# Patient Record
Sex: MALE | Race: WHITE | NOT HISPANIC OR LATINO | Employment: OTHER | URBAN - METROPOLITAN AREA
[De-identification: names, ages, dates, MRNs, and addresses within clinical notes are randomized per-mention and may not be internally consistent; named-entity substitution may affect disease eponyms.]

---

## 2018-11-15 ENCOUNTER — OFFICE VISIT (OUTPATIENT)
Dept: MEDICAL GROUP | Facility: MEDICAL CENTER | Age: 73
End: 2018-11-15
Payer: MEDICARE

## 2018-11-15 VITALS
WEIGHT: 223.33 LBS | OXYGEN SATURATION: 93 % | BODY MASS INDEX: 33.08 KG/M2 | TEMPERATURE: 99.1 F | SYSTOLIC BLOOD PRESSURE: 142 MMHG | DIASTOLIC BLOOD PRESSURE: 80 MMHG | HEART RATE: 92 BPM | HEIGHT: 69 IN | RESPIRATION RATE: 20 BRPM

## 2018-11-15 DIAGNOSIS — Z72.89 OTHER PROBLEMS RELATED TO LIFESTYLE: ICD-10-CM

## 2018-11-15 DIAGNOSIS — B35.1 ONYCHOMYCOSIS: ICD-10-CM

## 2018-11-15 DIAGNOSIS — Z12.11 COLON CANCER SCREENING: ICD-10-CM

## 2018-11-15 DIAGNOSIS — G89.29 CHRONIC LEFT SHOULDER PAIN: ICD-10-CM

## 2018-11-15 DIAGNOSIS — I10 ESSENTIAL HYPERTENSION: ICD-10-CM

## 2018-11-15 DIAGNOSIS — Z12.12 SCREENING FOR COLORECTAL CANCER: ICD-10-CM

## 2018-11-15 DIAGNOSIS — M79.645 PAIN IN FINGER OF BOTH HANDS: ICD-10-CM

## 2018-11-15 DIAGNOSIS — Z12.11 SCREENING FOR COLORECTAL CANCER: ICD-10-CM

## 2018-11-15 DIAGNOSIS — M79.644 PAIN IN FINGER OF BOTH HANDS: ICD-10-CM

## 2018-11-15 DIAGNOSIS — G47.8 SLEEP PARALYSIS: ICD-10-CM

## 2018-11-15 DIAGNOSIS — E78.5 DYSLIPIDEMIA: ICD-10-CM

## 2018-11-15 DIAGNOSIS — R73.03 PREDIABETES: ICD-10-CM

## 2018-11-15 DIAGNOSIS — Z13.1 SCREENING FOR DIABETES MELLITUS: ICD-10-CM

## 2018-11-15 DIAGNOSIS — Z23 NEED FOR VACCINATION: ICD-10-CM

## 2018-11-15 DIAGNOSIS — Z13.6 SCREENING FOR ISCHEMIC HEART DISEASE: ICD-10-CM

## 2018-11-15 DIAGNOSIS — E03.9 HYPOTHYROIDISM, UNSPECIFIED TYPE: ICD-10-CM

## 2018-11-15 DIAGNOSIS — M25.512 CHRONIC LEFT SHOULDER PAIN: ICD-10-CM

## 2018-11-15 PROBLEM — M79.646 FINGER PAIN: Status: ACTIVE | Noted: 2018-11-15

## 2018-11-15 PROCEDURE — 99204 OFFICE O/P NEW MOD 45 MIN: CPT | Performed by: FAMILY MEDICINE

## 2018-11-15 RX ORDER — LEVOTHYROXINE SODIUM 0.05 MG/1
TABLET ORAL
COMMUNITY
Start: 2018-09-14 | End: 2018-11-15

## 2018-11-15 RX ORDER — CICLOPIROX 80 MG/ML
SOLUTION TOPICAL
Qty: 1 BOTTLE | Refills: 6 | Status: SHIPPED | OUTPATIENT
Start: 2018-11-15 | End: 2022-06-15

## 2018-11-15 RX ORDER — LISINOPRIL 5 MG/1
5 TABLET ORAL DAILY
COMMUNITY
Start: 2018-09-14 | End: 2018-11-15 | Stop reason: SDUPTHER

## 2018-11-15 RX ORDER — METFORMIN HYDROCHLORIDE 500 MG/1
500 TABLET, EXTENDED RELEASE ORAL DAILY
COMMUNITY
End: 2019-01-03 | Stop reason: SDUPTHER

## 2018-11-15 RX ORDER — LISINOPRIL 5 MG/1
5 TABLET ORAL DAILY
Qty: 90 TAB | Refills: 0 | Status: SHIPPED
Start: 2018-11-15 | End: 2019-08-15 | Stop reason: SDUPTHER

## 2018-11-15 RX ORDER — ATENOLOL 50 MG/1
TABLET ORAL
COMMUNITY
Start: 2018-09-14 | End: 2018-11-15

## 2018-11-15 RX ORDER — ATORVASTATIN CALCIUM 20 MG/1
20 TABLET, FILM COATED ORAL DAILY
COMMUNITY
Start: 2018-09-14 | End: 2019-08-15 | Stop reason: SDUPTHER

## 2018-11-15 ASSESSMENT — PATIENT HEALTH QUESTIONNAIRE - PHQ9: CLINICAL INTERPRETATION OF PHQ2 SCORE: 0

## 2018-11-15 NOTE — ASSESSMENT & PLAN NOTE
Patient states that 10 or 15 years ago he recalls jerking his left arm.  He states that about 3 or 4 years ago he developed left shoulder pain that has become slowly progressive.  He has particular trouble lifting his left arm above his head.  Some topical remedies over-the-counter seem to provide some relief.  His symptoms are mild in severity.

## 2018-11-15 NOTE — PROGRESS NOTES
[facility]  Progress Note  New Patient    Subjective:   Benedict Marmolejo is a 72 y.o. male here today with a chief complaint of finger pain. This is a new patient to me. The patient comes in alone.     Finger pain  6 months ago the patient developed pain in the distal portion of his left fifth finger, noticeable when he would bend the distal portion of the phalanx back.  One month ago, he developed pain in the right distal fifth finger.  Again, this only occur if he bent that finger back.  Sometimes he will even notice of pain in the left fifth distal finger at rest.  He denies known trauma.  There is some deformity that he notes.    Sleep paralysis  Patient has a long-standing history of sleep paralysis.  He states this is described as feeling awake but paralyzed.  He states that if he moves a lot, he can break out of it.  He first experienced this when he was a child.  It came back about 10 years ago and occurs regularly.  The only way he seems to be able to avoid it consistently is lying on his left side.  Patient has never seen a sleep doctor for this.  His symptoms are moderate in severity.  Lying on his right side seems to exacerbate things.    Left shoulder pain  Patient states that 10 or 15 years ago he recalls jerking his left arm.  He states that about 3 or 4 years ago he developed left shoulder pain that has become slowly progressive.  He has particular trouble lifting his left arm above his head.  Some topical remedies over-the-counter seem to provide some relief.  His symptoms are mild in severity.    Onychomycosis  She describes foot fungus on the right toenails.  He has tried to treat this with over-the-counter remedies but all those seem to do were to make his nails more dystrophic.    Prediabetes  Patient reports prediabetes for which he is taking metformin.    Hypothyroid  Patient was on Synthroid for presumed hypothyroidism.  He has not taken this in a few months, he denies worsening  "fatigue.    Dyslipidemia  Patient is currently maintained on atorvastatin.    Essential hypertension  Patient is currently maintained on lisinopril for essential hypertension.  He was on atorvastatin but recently switched due to some insurance coverage issues.  He denies any known history of arrhythmia.      No current outpatient prescriptions on file prior to visit.     No current facility-administered medications on file prior to visit.        Past Medical History:   Diagnosis Date   • Diabetes (HCC)    • Hyperlipidemia    • Hypertension    • Thyroid disease        Allergies: Patient has no known allergies.    Surgical History:  has a past surgical history that includes eye surgery.    Family History: Family history is unknown by patient.    Social History:  reports that he quit smoking about 28 years ago. He has never used smokeless tobacco. He reports that he drinks alcohol. He reports that he does not use drugs.    ROS:   Constitutional ROS: No unexplained fevers, sweats, or chills  Eye ROS: No eye pain, redness, discharge  Ear ROS: No ear pain  Mouth/Throat ROS: No sore throat  Neck ROS: No swollen glands  Pulmonary ROS: No shortness of breath  Cardiovascular ROS: No chest pain  Gastrointestinal ROS: No abdominal pain  Musculoskeletal/Extremities ROS: No clubbing  Skin/Integumentary ROS: No evidence of rash  Neurologic ROS: No seizures  Psychiatric ROS: No depression       Objective:     Vitals:    11/15/18 1401 11/15/18 1406   BP: 144/80 142/80   BP Location: Left arm    Patient Position: Sitting    Pulse: 92    Resp: 20    Temp: 37.3 °C (99.1 °F)    TempSrc: Temporal    SpO2: 93%    Weight: 101.3 kg (223 lb 5.2 oz)    Height: 1.753 m (5' 9\")        Physical Exam:  Constitutional: Alert, no distress.  Skin: Warm, dry, good turgor, no rashes in visible areas.  Eye: Equal, conjunctiva clear, lids normal.  ENMT: Lips without lesions, good dentition, oropharynx clear.  Neck: Trachea midline, no masses, no " thyromegaly. No cervical or supraclavicular lymphadenopathy  Respiratory: Unlabored respiratory effort, lungs clear to auscultation, no wheezes, no ronchi.  Cardiovascular: Normal S1, S2, no murmur, no edema.  Abdomen: Soft, obese.  Psych: Alert and oriented, normal affect and mood.  MSK: some radial deviation of distal fifth digits bilaterally. Pain on empty can testing of L shoulder but no pain or weakness on internal or external rotation of bilateral shoulders.   Skin: dystrophic R toenails.         Assessment and Plan:     1. Screening for colorectal cancer  - FIT.     2. Pain in finger of both hands  Suspect OA.   - DX-JOINT SURVEY-HANDS SINGLE VIEW; Future    3. Sleep paralysis  - REFERRAL TO SLEEP STUDIES    4. Chronic left shoulder pain  Likely rotator cuff tendinitis. Will get XR and consider injection.   - DX-SHOULDER 2+ LEFT; Future    5. Onychomycosis  Discussed lamisil. Patient would like to try topicals despite their limitations.   - ciclopirox (CICLODAN) 8 % solution; Apply a thin layer to affected nails once daily. Clean off with alcohol once weekly.  Dispense: 1 Bottle; Refill: 6    6. Screening for ischemic heart disease  - lipid panel.     7. Screening for diabetes mellitus  - cmp.     8. Colon cancer screening  - OCCULT BLOOD FECES IMMUNOASSAY; Future    9. Other problems related to lifestyle  - HEP C VIRUS ANTIBODY; Future    10. Essential hypertension  - continue lisinopril.     11. Prediabetes  - consider stopping metformin.   - COMP METABOLIC PANEL; Future  - HEMOGLOBIN A1C; Future    12. Dyslipidemia  - continue statin.   - COMP METABOLIC PANEL; Future  - Lipid Profile; Future    13. Hypothyroidism, unspecified type  I wonder if patient even needs to be on Synthroid.   - TSH WITH REFLEX TO FT4; Future            Followup: Return in about 6 weeks (around 12/27/2018), or if symptoms worsen or fail to improve.

## 2018-11-15 NOTE — ASSESSMENT & PLAN NOTE
Patient has a long-standing history of sleep paralysis.  He states this is described as feeling awake but paralyzed.  He states that if he moves a lot, he can break out of it.  He first experienced this when he was a child.  It came back about 10 years ago and occurs regularly.  The only way he seems to be able to avoid it consistently is lying on his left side.  Patient has never seen a sleep doctor for this.  His symptoms are moderate in severity.  Lying on his right side seems to exacerbate things.

## 2018-11-15 NOTE — ASSESSMENT & PLAN NOTE
She describes foot fungus on the right toenails.  He has tried to treat this with over-the-counter remedies but all those seem to do were to make his nails more dystrophic.

## 2018-11-15 NOTE — ASSESSMENT & PLAN NOTE
Patient was on Synthroid for presumed hypothyroidism.  He has not taken this in a few months, he denies worsening fatigue.

## 2018-11-15 NOTE — ASSESSMENT & PLAN NOTE
6 months ago the patient developed pain in the distal portion of his left fifth finger, noticeable when he would bend the distal portion of the phalanx back.  One month ago, he developed pain in the right distal fifth finger.  Again, this only occur if he bent that finger back.  Sometimes he will even notice of pain in the left fifth distal finger at rest.  He denies known trauma.  There is some deformity that he notes.

## 2018-11-15 NOTE — ASSESSMENT & PLAN NOTE
Patient is currently maintained on lisinopril for essential hypertension.  He was on atorvastatin but recently switched due to some insurance coverage issues.  He denies any known history of arrhythmia.

## 2018-11-20 ENCOUNTER — OFFICE VISIT (OUTPATIENT)
Dept: URGENT CARE | Facility: CLINIC | Age: 73
End: 2018-11-20
Payer: MEDICARE

## 2018-11-20 VITALS
HEIGHT: 69 IN | OXYGEN SATURATION: 100 % | BODY MASS INDEX: 33.03 KG/M2 | TEMPERATURE: 98.8 F | HEART RATE: 94 BPM | RESPIRATION RATE: 12 BRPM | DIASTOLIC BLOOD PRESSURE: 78 MMHG | WEIGHT: 223 LBS | SYSTOLIC BLOOD PRESSURE: 126 MMHG

## 2018-11-20 DIAGNOSIS — J34.0 CELLULITIS OF NASAL TIP: ICD-10-CM

## 2018-11-20 PROCEDURE — 99213 OFFICE O/P EST LOW 20 MIN: CPT | Performed by: PHYSICIAN ASSISTANT

## 2018-11-20 RX ORDER — SULFAMETHOXAZOLE AND TRIMETHOPRIM 800; 160 MG/1; MG/1
1 TABLET ORAL 2 TIMES DAILY
Qty: 14 TAB | Refills: 0 | Status: SHIPPED | OUTPATIENT
Start: 2018-11-20 | End: 2018-11-27

## 2018-11-20 ASSESSMENT — ENCOUNTER SYMPTOMS
VOMITING: 0
SORE THROAT: 0
SINUS PAIN: 0
MYALGIAS: 0
SHORTNESS OF BREATH: 0
CHILLS: 0
NAUSEA: 0
HEADACHES: 0
FEVER: 0
COUGH: 0

## 2018-11-20 NOTE — PROGRESS NOTES
"Subjective:      Benedict Marmolejo is a 73 y.o. male who presents with Other (Rt side on nose is swollen red and pt think it might be an infection x 3days )            Patient is here with complaints of right nasal tip redness and swelling x 3 days. Worsening over past 24 hours. Patient has history of acne. He denies fever or chills. He denies any drainage form the area. He has no nausea or vomiting.       Past Medical History:   Diagnosis Date   • Diabetes (HCC)    • Hyperlipidemia    • Hypertension    • Thyroid disease        Past Surgical History:   Procedure Laterality Date   • EYE SURGERY         Family History   Problem Relation Age of Onset   • Family history unknown: Yes       No Known Allergies    Medications, Allergies, and current problem list reviewed today in Epic      Review of Systems   Constitutional: Negative for chills, fever and malaise/fatigue.   HENT: Negative for congestion, ear discharge, ear pain, sinus pain and sore throat.    Respiratory: Negative for cough and shortness of breath.    Gastrointestinal: Negative for nausea and vomiting.   Musculoskeletal: Negative for myalgias.   Skin:        Nose with redness and swelling    Neurological: Negative for headaches.     All other systems reviewed and are negative.        Objective:     /78   Pulse 94   Temp 37.1 °C (98.8 °F)   Resp 12   Ht 1.753 m (5' 9\")   Wt 101.2 kg (223 lb)   SpO2 100%   BMI 32.93 kg/m²      Physical Exam   Constitutional: He is oriented to person, place, and time. He appears well-developed and well-nourished. No distress.   HENT:   Head: Normocephalic and atraumatic.   Nose: Right sinus exhibits no maxillary sinus tenderness. Left sinus exhibits no maxillary sinus tenderness.       Eyes: Conjunctivae are normal.   Pulmonary/Chest: Effort normal. No respiratory distress.   Neurological: He is alert and oriented to person, place, and time. No cranial nerve deficit.   Psychiatric: He has a normal mood and affect. " His behavior is normal. Judgment and thought content normal.               Assessment/Plan:     1. Cellulitis of nasal tip    - sulfamethoxazole-trimethoprim (BACTRIM DS) 800-160 MG tablet; Take 1 Tab by mouth 2 times a day for 7 days.  Dispense: 14 Tab; Refill: 0     Differential diagnoses, Supportive care, and indications for immediate follow-up discussed with patient.   Instructed to return to clinic or nearest emergency department for any change in condition, further concerns, or worsening of symptoms.    The patient demonstrated a good understanding and agreed with the treatment plan.    Gill Keller P.A.-C.

## 2018-11-27 ENCOUNTER — HOSPITAL ENCOUNTER (OUTPATIENT)
Dept: LAB | Facility: MEDICAL CENTER | Age: 73
End: 2018-11-27
Attending: FAMILY MEDICINE
Payer: MEDICARE

## 2018-11-27 DIAGNOSIS — E78.5 DYSLIPIDEMIA: ICD-10-CM

## 2018-11-27 DIAGNOSIS — E03.9 HYPOTHYROIDISM, UNSPECIFIED TYPE: ICD-10-CM

## 2018-11-27 DIAGNOSIS — Z72.89 OTHER PROBLEMS RELATED TO LIFESTYLE: ICD-10-CM

## 2018-11-27 DIAGNOSIS — R73.03 PREDIABETES: ICD-10-CM

## 2018-11-27 PROCEDURE — 36415 COLL VENOUS BLD VENIPUNCTURE: CPT

## 2018-11-27 PROCEDURE — 80061 LIPID PANEL: CPT

## 2018-11-27 PROCEDURE — 83036 HEMOGLOBIN GLYCOSYLATED A1C: CPT | Mod: GA

## 2018-11-27 PROCEDURE — 86803 HEPATITIS C AB TEST: CPT

## 2018-11-27 PROCEDURE — 84443 ASSAY THYROID STIM HORMONE: CPT

## 2018-11-27 PROCEDURE — 80053 COMPREHEN METABOLIC PANEL: CPT

## 2018-11-28 LAB
ALBUMIN SERPL BCP-MCNC: 4.3 G/DL (ref 3.2–4.9)
ALBUMIN/GLOB SERPL: 1.5 G/DL
ALP SERPL-CCNC: 77 U/L (ref 30–99)
ALT SERPL-CCNC: 42 U/L (ref 2–50)
ANION GAP SERPL CALC-SCNC: 9 MMOL/L (ref 0–11.9)
AST SERPL-CCNC: 21 U/L (ref 12–45)
BILIRUB SERPL-MCNC: 0.6 MG/DL (ref 0.1–1.5)
BUN SERPL-MCNC: 18 MG/DL (ref 8–22)
CALCIUM SERPL-MCNC: 9 MG/DL (ref 8.5–10.5)
CHLORIDE SERPL-SCNC: 104 MMOL/L (ref 96–112)
CHOLEST SERPL-MCNC: 206 MG/DL (ref 100–199)
CO2 SERPL-SCNC: 25 MMOL/L (ref 20–33)
CREAT SERPL-MCNC: 1.03 MG/DL (ref 0.5–1.4)
EST. AVERAGE GLUCOSE BLD GHB EST-MCNC: 140 MG/DL
FASTING STATUS PATIENT QL REPORTED: NORMAL
GLOBULIN SER CALC-MCNC: 2.9 G/DL (ref 1.9–3.5)
GLUCOSE SERPL-MCNC: 93 MG/DL (ref 65–99)
HBA1C MFR BLD: 6.5 % (ref 0–5.6)
HCV AB SER QL: NEGATIVE
HDLC SERPL-MCNC: 45 MG/DL
LDLC SERPL CALC-MCNC: 115 MG/DL
POTASSIUM SERPL-SCNC: 5.3 MMOL/L (ref 3.6–5.5)
PROT SERPL-MCNC: 7.2 G/DL (ref 6–8.2)
SODIUM SERPL-SCNC: 138 MMOL/L (ref 135–145)
TRIGL SERPL-MCNC: 229 MG/DL (ref 0–149)
TSH SERPL DL<=0.005 MIU/L-ACNC: 4.14 UIU/ML (ref 0.38–5.33)

## 2018-12-03 ENCOUNTER — HOSPITAL ENCOUNTER (OUTPATIENT)
Dept: RADIOLOGY | Facility: MEDICAL CENTER | Age: 73
End: 2018-12-03
Attending: FAMILY MEDICINE
Payer: MEDICARE

## 2018-12-03 DIAGNOSIS — M25.512 CHRONIC LEFT SHOULDER PAIN: ICD-10-CM

## 2018-12-03 DIAGNOSIS — M79.644 PAIN IN FINGER OF BOTH HANDS: ICD-10-CM

## 2018-12-03 DIAGNOSIS — M79.645 PAIN IN FINGER OF BOTH HANDS: ICD-10-CM

## 2018-12-03 DIAGNOSIS — G89.29 CHRONIC LEFT SHOULDER PAIN: ICD-10-CM

## 2018-12-03 PROCEDURE — 77077 JOINT SURVEY SINGLE VIEW: CPT

## 2018-12-03 PROCEDURE — 73030 X-RAY EXAM OF SHOULDER: CPT | Mod: LT

## 2018-12-05 DIAGNOSIS — M79.645 PAIN IN FINGER OF BOTH HANDS: ICD-10-CM

## 2018-12-05 DIAGNOSIS — M79.644 PAIN IN FINGER OF BOTH HANDS: ICD-10-CM

## 2019-01-03 ENCOUNTER — OFFICE VISIT (OUTPATIENT)
Dept: MEDICAL GROUP | Facility: MEDICAL CENTER | Age: 74
End: 2019-01-03
Payer: MEDICARE

## 2019-01-03 VITALS
BODY MASS INDEX: 32.58 KG/M2 | SYSTOLIC BLOOD PRESSURE: 122 MMHG | HEART RATE: 93 BPM | OXYGEN SATURATION: 94 % | HEIGHT: 69 IN | RESPIRATION RATE: 18 BRPM | WEIGHT: 220 LBS | TEMPERATURE: 99 F | DIASTOLIC BLOOD PRESSURE: 82 MMHG

## 2019-01-03 DIAGNOSIS — E78.5 DYSLIPIDEMIA: ICD-10-CM

## 2019-01-03 DIAGNOSIS — G47.8 SLEEP PARALYSIS: ICD-10-CM

## 2019-01-03 DIAGNOSIS — M25.512 CHRONIC LEFT SHOULDER PAIN: ICD-10-CM

## 2019-01-03 DIAGNOSIS — M79.645 PAIN IN FINGER OF BOTH HANDS: ICD-10-CM

## 2019-01-03 DIAGNOSIS — M79.644 PAIN IN FINGER OF BOTH HANDS: ICD-10-CM

## 2019-01-03 DIAGNOSIS — E03.9 HYPOTHYROIDISM, UNSPECIFIED TYPE: ICD-10-CM

## 2019-01-03 DIAGNOSIS — G89.29 CHRONIC LEFT SHOULDER PAIN: ICD-10-CM

## 2019-01-03 DIAGNOSIS — I10 ESSENTIAL HYPERTENSION: ICD-10-CM

## 2019-01-03 DIAGNOSIS — E11.9 TYPE 2 DIABETES MELLITUS WITHOUT COMPLICATION, WITHOUT LONG-TERM CURRENT USE OF INSULIN (HCC): ICD-10-CM

## 2019-01-03 PROCEDURE — 99214 OFFICE O/P EST MOD 30 MIN: CPT | Performed by: FAMILY MEDICINE

## 2019-01-03 RX ORDER — LEVOTHYROXINE SODIUM 0.05 MG/1
1 TABLET ORAL DAILY
COMMUNITY
Start: 2018-11-27 | End: 2019-08-15 | Stop reason: SDUPTHER

## 2019-01-03 RX ORDER — METFORMIN HYDROCHLORIDE 500 MG/1
500 TABLET, EXTENDED RELEASE ORAL DAILY
Qty: 90 TAB | Refills: 4 | Status: SHIPPED | OUTPATIENT
Start: 2019-01-03 | End: 2019-01-03 | Stop reason: SDUPTHER

## 2019-01-03 RX ORDER — METFORMIN HYDROCHLORIDE 500 MG/1
500 TABLET, EXTENDED RELEASE ORAL DAILY
Qty: 90 TAB | Refills: 4 | Status: SHIPPED | OUTPATIENT
Start: 2019-01-03 | End: 2019-08-15

## 2019-01-03 ASSESSMENT — PATIENT HEALTH QUESTIONNAIRE - PHQ9: CLINICAL INTERPRETATION OF PHQ2 SCORE: 0

## 2019-01-04 NOTE — ASSESSMENT & PLAN NOTE
Patient's sleep paralysis symptoms are improving.  He is considering making an appointment with sleep medicine.

## 2019-01-04 NOTE — ASSESSMENT & PLAN NOTE
Patient continues to complain of bilateral finger pain in the DIP joint of the fifth fingers.  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  I ordered some follow-up autoimmune testing but the patient has not gotten this done yet.  His symptoms are slowly progressive.

## 2019-01-04 NOTE — ASSESSMENT & PLAN NOTE
Patient continues to complain of mild severity left shoulder pain worse when he lifts his arm above his head.  X-rays show arthritis.  He is not currently interested in joint injection, physical therapy or surgical consultation.

## 2019-01-04 NOTE — ASSESSMENT & PLAN NOTE
Patient has controlled type 2 diabetes and is maintained on metformin, lisinopril and atorvastatin.  He is also on baby aspirin.

## 2019-01-04 NOTE — PROGRESS NOTES
[facility]  Progress Note  Established Patient    Subjective:   Benedict Marmolejo is a 73 y.o. male here today with a chief complaint of finger pain. The patient is alone.     Finger pain  Patient continues to complain of bilateral finger pain in the DIP joint of the fifth fingers.  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  I ordered some follow-up autoimmune testing but the patient has not gotten this done yet.  His symptoms are slowly progressive.    Sleep paralysis  Patient's sleep paralysis symptoms are improving.  He is considering making an appointment with sleep medicine.    Left shoulder pain  Patient continues to complain of mild severity left shoulder pain worse when he lifts his arm above his head.  X-rays show arthritis.  He is not currently interested in joint injection, physical therapy or surgical consultation.    Type 2 diabetes mellitus without complication, without long-term current use of insulin (HCC)  Patient has controlled type 2 diabetes and is maintained on metformin, lisinopril and atorvastatin.  He is also on baby aspirin.    Dyslipidemia  Cholesterol is controlled with atorvastatin    Hypothyroid  Hypothyroidism is at goal on Synthroid.    Essential hypertension  Patient's blood pressure is at goal on lisinopril.      Current Outpatient Prescriptions on File Prior to Visit   Medication Sig Dispense Refill   • atorvastatin (LIPITOR) 20 MG Tab Take 20 mg by mouth every day.     • ciclopirox (CICLODAN) 8 % solution Apply a thin layer to affected nails once daily. Clean off with alcohol once weekly. 1 Bottle 6   • aspirin EC (ECOTRIN) 81 MG Tablet Delayed Response Take 1 Tab by mouth every day. 90 Tab 4   • lisinopril (PRINIVIL) 5 MG Tab Take 1 Tab by mouth every day. 90 Tab 0     No current facility-administered medications on file prior to visit.        Past Medical History:   Diagnosis Date   • Diabetes (HCC)    • Hyperlipidemia    • Hypertension    •  "Thyroid disease        Allergies: Patient has no known allergies.    Surgical History:  has a past surgical history that includes eye surgery.    Family History: Family history is unknown by patient.    Social History:  reports that he quit smoking about 29 years ago. He has never used smokeless tobacco. He reports that he drinks alcohol. He reports that he does not use drugs.    ROS: no fever or nausea.        Objective:     Vitals:    01/03/19 1535   BP: 122/82   BP Location: Left arm   Patient Position: Sitting   BP Cuff Size: Large adult   Pulse: 93   Resp: 18   Temp: 37.2 °C (99 °F)   TempSrc: Temporal   SpO2: 94%   Weight: 99.8 kg (220 lb)   Height: 1.753 m (5' 9\")       Physical Exam:  General: alert in no apparent distress.   Affect: normal.       Assessment and Plan:     1. Type 2 diabetes mellitus without complication, without long-term current use of insulin (HCC)  - continue current regimen.   - metFORMIN ER (GLUCOPHAGE XR) 500 MG TABLET SR 24 HR; Take 1 Tab by mouth every day.  Dispense: 90 Tab; Refill: 4    2. Pain in finger of both hands   - reminded to get labs, re-printed for him.     3. Sleep paralysis  - patient will consider appt with sleep medicine.     4. Chronic left shoulder pain  - Salonpas.   - patient will let me know if he'd like a joint injection.     5. Dyslipidemia  - continue statin.    6. Hypothyroidism, unspecified type   - continue Synthroid.     7. Essential hypertension  - continue lisinopril.         Followup: Return in about 3 months (around 4/3/2019), or if symptoms worsen or fail to improve, for DM.         "

## 2019-04-11 ENCOUNTER — OFFICE VISIT (OUTPATIENT)
Dept: MEDICAL GROUP | Facility: MEDICAL CENTER | Age: 74
End: 2019-04-11
Payer: MEDICARE

## 2019-04-11 VITALS
SYSTOLIC BLOOD PRESSURE: 122 MMHG | WEIGHT: 223 LBS | RESPIRATION RATE: 18 BRPM | TEMPERATURE: 98.3 F | BODY MASS INDEX: 33.03 KG/M2 | HEIGHT: 69 IN | DIASTOLIC BLOOD PRESSURE: 80 MMHG | HEART RATE: 89 BPM | OXYGEN SATURATION: 99 %

## 2019-04-11 DIAGNOSIS — I10 ESSENTIAL HYPERTENSION: ICD-10-CM

## 2019-04-11 DIAGNOSIS — E03.9 HYPOTHYROIDISM, UNSPECIFIED TYPE: ICD-10-CM

## 2019-04-11 DIAGNOSIS — E78.5 DYSLIPIDEMIA: ICD-10-CM

## 2019-04-11 DIAGNOSIS — M79.644 PAIN IN FINGER OF BOTH HANDS: ICD-10-CM

## 2019-04-11 DIAGNOSIS — M79.645 PAIN IN FINGER OF BOTH HANDS: ICD-10-CM

## 2019-04-11 DIAGNOSIS — E11.9 TYPE 2 DIABETES MELLITUS WITHOUT COMPLICATION, WITHOUT LONG-TERM CURRENT USE OF INSULIN (HCC): ICD-10-CM

## 2019-04-11 PROCEDURE — 99214 OFFICE O/P EST MOD 30 MIN: CPT | Performed by: FAMILY MEDICINE

## 2019-04-11 NOTE — ASSESSMENT & PLAN NOTE
Hypoglycemic therapy: Metformin  daily.   Last A1c: ordered.  Aspirin:  Taking.   Statin:  Taking.   ACE:  Taking.   Urine Microalbumin:  No indication, on ACE.   Monofilament Exam:  Done 04/11/19.   Retinal Screening:  Referred 04/11/19.   Pneumonia vaccine:  Received.

## 2019-04-11 NOTE — PROGRESS NOTES
[facility]  Progress Note  Established Patient    Subjective:   Benedict Marmolejo is a 73 y.o. male here today with a chief complaint of diabetes. The patient is alone.     Type 2 diabetes mellitus without complication, without long-term current use of insulin ([facility])  Hypoglycemic therapy: Metformin  daily.   Last A1c: ordered.  Aspirin:  Taking.   Statin:  Taking.   ACE:  Taking.   Urine Microalbumin:  No indication, on ACE.   Monofilament Exam:  Done 04/11/19.   Retinal Screening:  Referred 04/11/19.   Pneumonia vaccine:  Received.       Hypothyroid  Patient's hypothyroidism is controlled with Synthroid.    Finger pain  Patient continues to complain of bilateral finger pains.  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  I ordered some follow-up autoimmune testing but the patient has not gotten this done yet.    Essential hypertension  Patient's blood pressure is at goal on his current medication regimen.    Dyslipidemia  Patient's the patient states that he is controlled with a statin medicine.      Current Outpatient Prescriptions on File Prior to Visit   Medication Sig Dispense Refill   • levothyroxine (SYNTHROID) 50 MCG Tab Take 1 Tab by mouth every day.     • metFORMIN ER (GLUCOPHAGE XR) 500 MG TABLET SR 24 HR Take 1 Tab by mouth every day. 90 Tab 4   • atorvastatin (LIPITOR) 20 MG Tab Take 20 mg by mouth every day.     • ciclopirox (CICLODAN) 8 % solution Apply a thin layer to affected nails once daily. Clean off with alcohol once weekly. 1 Bottle 6   • aspirin EC (ECOTRIN) 81 MG Tablet Delayed Response Take 1 Tab by mouth every day. 90 Tab 4   • lisinopril (PRINIVIL) 5 MG Tab Take 1 Tab by mouth every day. 90 Tab 0     No current facility-administered medications on file prior to visit.        Past Medical History:   Diagnosis Date   • Diabetes (HCC)    • Hyperlipidemia    • Hypertension    • Thyroid disease        Allergies: Patient has no known allergies.    Surgical  "History:  has a past surgical history that includes eye surgery.    Family History: Family history is unknown by patient.    Social History:  reports that he quit smoking about 29 years ago. He has never used smokeless tobacco. He reports that he drinks alcohol. He reports that he does not use drugs.    ROS: no fever or nausea.        Objective:     Vitals:    04/11/19 1516   BP: 122/80   BP Location: Left arm   Patient Position: Sitting   BP Cuff Size: Adult   Pulse: 89   Resp: 18   Temp: 36.8 °C (98.3 °F)   TempSrc: Temporal   SpO2: 99%   Weight: 101.2 kg (223 lb)   Height: 1.753 m (5' 9\")       Physical Exam:  General: alert in no apparent distress.   MSK: no obvious synovitis in hands.     Monofilament testing with a 10 gram force: sensation intact: intact bilaterally  Visual Inspection: Feet without maceration, ulcers, fissures. Onychyomycosis noted.  Pedal pulses: intact bilaterally          Assessment and Plan:     1. Type 2 diabetes mellitus without complication, without long-term current use of insulin (HCC)  - continue current regimen.   - REFERRAL TO OPHTHALMOLOGY  - HEMOGLOBIN A1C; Future  - Diabetic Monofilament LE Exam    2. Hypothyroidism, unspecified type  - continue Synthroid.   - TSH; Future    3. Pain in finger of both hands  Discussed risk of inflammatory arthritis again and recommended labs. These were re-ordered.   - WESTERGREN SED RATE; Future  - CRP QUANTITIVE (NON-CARDIAC); Future  - JAK REFLEXIVE PROFILE; Future  - RHEUMATOID ARTHRITIS FACTOR; Future  - CCP ANTIBODY; Future    4. Essential hypertension  - continue current regimen.     5. Dyslipidemia  - continue statin.         Followup: Return in about 4 months (around 8/11/2019), or if symptoms worsen or fail to improve.         "

## 2019-07-05 ENCOUNTER — HOSPITAL ENCOUNTER (OUTPATIENT)
Dept: LAB | Facility: MEDICAL CENTER | Age: 74
End: 2019-07-05
Attending: FAMILY MEDICINE
Payer: MEDICARE

## 2019-07-05 DIAGNOSIS — E11.9 TYPE 2 DIABETES MELLITUS WITHOUT COMPLICATION, WITHOUT LONG-TERM CURRENT USE OF INSULIN (HCC): ICD-10-CM

## 2019-07-05 DIAGNOSIS — M79.645 PAIN IN FINGER OF BOTH HANDS: ICD-10-CM

## 2019-07-05 DIAGNOSIS — M79.644 PAIN IN FINGER OF BOTH HANDS: ICD-10-CM

## 2019-07-05 DIAGNOSIS — E03.9 HYPOTHYROIDISM, UNSPECIFIED TYPE: ICD-10-CM

## 2019-07-05 LAB
CRP SERPL HS-MCNC: 0.04 MG/DL (ref 0–0.75)
ERYTHROCYTE [SEDIMENTATION RATE] IN BLOOD BY WESTERGREN METHOD: 3 MM/HOUR (ref 0–20)
RHEUMATOID FACT SER IA-ACNC: <10 IU/ML (ref 0–14)
TSH SERPL DL<=0.005 MIU/L-ACNC: 5.51 UIU/ML (ref 0.38–5.33)

## 2019-07-05 PROCEDURE — 86235 NUCLEAR ANTIGEN ANTIBODY: CPT

## 2019-07-05 PROCEDURE — 86431 RHEUMATOID FACTOR QUANT: CPT

## 2019-07-05 PROCEDURE — 83036 HEMOGLOBIN GLYCOSYLATED A1C: CPT | Mod: GA

## 2019-07-05 PROCEDURE — 86039 ANTINUCLEAR ANTIBODIES (ANA): CPT

## 2019-07-05 PROCEDURE — 36415 COLL VENOUS BLD VENIPUNCTURE: CPT

## 2019-07-05 PROCEDURE — 86140 C-REACTIVE PROTEIN: CPT

## 2019-07-05 PROCEDURE — 86038 ANTINUCLEAR ANTIBODIES: CPT

## 2019-07-05 PROCEDURE — 86225 DNA ANTIBODY NATIVE: CPT

## 2019-07-05 PROCEDURE — 86200 CCP ANTIBODY: CPT

## 2019-07-05 PROCEDURE — 85652 RBC SED RATE AUTOMATED: CPT

## 2019-07-05 PROCEDURE — 84443 ASSAY THYROID STIM HORMONE: CPT

## 2019-07-06 LAB
EST. AVERAGE GLUCOSE BLD GHB EST-MCNC: 123 MG/DL
HBA1C MFR BLD: 5.9 % (ref 0–5.6)

## 2019-07-07 LAB
CCP IGG SERPL-ACNC: 3 UNITS (ref 0–19)
NUCLEAR IGG SER QL IA: DETECTED

## 2019-07-09 LAB
ANA INTERPRETIVE COMMENT Q5143: ABNORMAL
ANA PATTERN 2 Q5146: ABNORMAL
ANA PATTERN Q5144: ABNORMAL
ANA TITER 2 Q5147: ABNORMAL
ANA TITER Q5145: ABNORMAL
ANTINUCLEAR ANTIBODY (ANA), HEP-2, IGG Q5142: DETECTED

## 2019-07-10 DIAGNOSIS — R76.8 POSITIVE ANA (ANTINUCLEAR ANTIBODY): ICD-10-CM

## 2019-07-10 DIAGNOSIS — M12.9 ARTHROPATHY: ICD-10-CM

## 2019-07-10 LAB
DSDNA AB TITR SER CLIF: NORMAL {TITER}
ENA JO1 AB TITR SER: 2 AU/ML (ref 0–40)
ENA SCL70 IGG SER QL: 2 AU/ML (ref 0–40)
ENA SM IGG SER-ACNC: 0 AU/ML (ref 0–40)
ENA SS-B IGG SER IA-ACNC: 0 AU/ML (ref 0–40)
SSA52 R0ENA AB IGG Q0420: 2 AU/ML (ref 0–40)
SSA60 R0ENA AB IGG Q0419: 1 AU/ML (ref 0–40)
U1 SNRNP IGG SER QL: 3 AU/ML (ref 0–40)

## 2019-07-17 ENCOUNTER — TELEPHONE (OUTPATIENT)
Dept: MEDICAL GROUP | Facility: MEDICAL CENTER | Age: 74
End: 2019-07-17

## 2019-07-17 NOTE — TELEPHONE ENCOUNTER
----- Message from Benedict Marmolejo sent at 7/17/2019  5:01 AM PDT -----  Regarding: Labs  Hi Mr. Marmolejo,     I just released your labs to you. Overall, they look pretty good; however, your JAK is positive. Sometimes this suggests a possible autoimmune issue which may contribute to your joint issues.     I recommend we have you see a rheumatologist and have placed the referral.     Please don't hesitate to call or write with any concerns.   Myles Strong M.D.

## 2019-07-17 NOTE — TELEPHONE ENCOUNTER
Patient didn't read [employer] message. Please relay the message below to this patient. Please also provide him with the rheumatology referral information.

## 2019-07-17 NOTE — TELEPHONE ENCOUNTER
Phone Number Called: [phone] (home)     Call outcome: spoke to patient regarding message below    Message: informed patient of results and referral, asked him to call our office if he does not hear back from them by next week

## 2019-08-15 ENCOUNTER — OFFICE VISIT (OUTPATIENT)
Dept: MEDICAL GROUP | Facility: MEDICAL CENTER | Age: 74
End: 2019-08-15
Payer: MEDICARE

## 2019-08-15 VITALS
DIASTOLIC BLOOD PRESSURE: 84 MMHG | BODY MASS INDEX: 32.82 KG/M2 | RESPIRATION RATE: 14 BRPM | WEIGHT: 221.6 LBS | SYSTOLIC BLOOD PRESSURE: 134 MMHG | TEMPERATURE: 97.9 F | OXYGEN SATURATION: 95 % | HEIGHT: 69 IN | HEART RATE: 89 BPM

## 2019-08-15 DIAGNOSIS — E11.9 TYPE 2 DIABETES MELLITUS WITHOUT COMPLICATION, WITHOUT LONG-TERM CURRENT USE OF INSULIN (HCC): ICD-10-CM

## 2019-08-15 DIAGNOSIS — E03.9 HYPOTHYROIDISM, UNSPECIFIED TYPE: ICD-10-CM

## 2019-08-15 DIAGNOSIS — E78.5 DYSLIPIDEMIA: ICD-10-CM

## 2019-08-15 DIAGNOSIS — I10 ESSENTIAL HYPERTENSION: ICD-10-CM

## 2019-08-15 DIAGNOSIS — M25.512 CHRONIC LEFT SHOULDER PAIN: ICD-10-CM

## 2019-08-15 DIAGNOSIS — G89.29 CHRONIC LEFT SHOULDER PAIN: ICD-10-CM

## 2019-08-15 PROCEDURE — 99214 OFFICE O/P EST MOD 30 MIN: CPT | Performed by: FAMILY MEDICINE

## 2019-08-15 RX ORDER — METFORMIN HYDROCHLORIDE 500 MG/1
500 TABLET, EXTENDED RELEASE ORAL DAILY
Qty: 100 TAB | Refills: 4 | Status: SHIPPED | OUTPATIENT
Start: 2019-08-15 | End: 2019-12-09

## 2019-08-15 RX ORDER — ATORVASTATIN CALCIUM 20 MG/1
20 TABLET, FILM COATED ORAL DAILY
Qty: 100 TAB | Refills: 4 | Status: SHIPPED | OUTPATIENT
Start: 2019-08-15 | End: 2020-07-02 | Stop reason: SDUPTHER

## 2019-08-15 RX ORDER — LISINOPRIL 5 MG/1
5 TABLET ORAL DAILY
Qty: 100 TAB | Refills: 4 | Status: SHIPPED | OUTPATIENT
Start: 2019-08-15 | End: 2020-07-02 | Stop reason: SDUPTHER

## 2019-08-15 RX ORDER — LEVOTHYROXINE SODIUM 0.05 MG/1
50 TABLET ORAL DAILY
Qty: 100 TAB | Refills: 4 | Status: SHIPPED | OUTPATIENT
Start: 2019-08-15 | End: 2020-07-02 | Stop reason: SDUPTHER

## 2019-08-15 SDOH — HEALTH STABILITY: MENTAL HEALTH: HOW OFTEN DO YOU HAVE 6 OR MORE DRINKS ON ONE OCCASION?: DAILY OR ALMOST DAILY

## 2019-08-15 NOTE — ASSESSMENT & PLAN NOTE
Hypoglycemic therapy: Metformin  daily.   Last A1c: 5.9.   Aspirin:  Taking.   Statin:  Taking.   ACE:  Taking.   Urine Microalbumin: ordered.  Monofilament Exam:  Done 04/11/19.   Retinal Screening:  Referred 04/11/19.   Pneumonia vaccine:  Received.

## 2019-08-15 NOTE — PROGRESS NOTES
[facility]  Progress Note  Established Patient    Subjective:   Benedict Marmolejo is a 73 y.o. male here today with a chief complaint of diabetes. The patient is alone.     Type 2 diabetes mellitus without complication, without long-term current use of insulin (HCC)  Hypoglycemic therapy: Metformin  daily.   Last A1c: 5.9.   Aspirin:  Taking.   Statin:  Taking.   ACE:  Taking.   Urine Microalbumin: ordered.  Monofilament Exam:  Done 04/11/19.   Retinal Screening:  Referred 04/11/19.   Pneumonia vaccine:  Received.     Dyslipidemia  Patient's dyslipidemia is well controlled on atorvastatin.    Essential hypertension  Patient's blood pressure is at goal on his current medication regimen.    Hypothyroid  Patient's last TSH was a little bit elevated.    Left shoulder pain  Patient continues to complain of mild severity left shoulder pain worse when he lifts his arm above his head.  X-rays show arthritis.  He is not currently interested in joint injection, however, he would be interested in physical therapy      Current Outpatient Medications on File Prior to Visit   Medication Sig Dispense Refill   • ciclopirox (CICLODAN) 8 % solution Apply a thin layer to affected nails once daily. Clean off with alcohol once weekly. 1 Bottle 6   • aspirin EC (ECOTRIN) 81 MG Tablet Delayed Response Take 1 Tab by mouth every day. 90 Tab 4     No current facility-administered medications on file prior to visit.        Past Medical History:   Diagnosis Date   • Diabetes (HCC)    • Hyperlipidemia    • Hypertension    • Thyroid disease        Allergies: Patient has no known allergies.    Surgical History:  has a past surgical history that includes eye surgery.    Family History: Family history is unknown by patient.    Social History:  reports that he quit smoking about 29 years ago. He has never used smokeless tobacco. He reports that he drinks about 1.2 - 1.8 oz of alcohol per week. He reports that he does not use  "drugs.    ROS: no fever or nausea.        Objective:     Vitals:    08/15/19 1502   BP: 134/84   BP Location: Left arm   Patient Position: Sitting   BP Cuff Size: Adult   Pulse: 89   Resp: 14   Temp: 36.6 °C (97.9 °F)   TempSrc: Temporal   SpO2: 95%   Weight: 100.5 kg (221 lb 9.6 oz)   Height: 1.753 m (5' 9\")       Physical Exam:  General: alert in no apparent distress.         Assessment and Plan:     1. Dyslipidemia  - atorvastatin (LIPITOR) 20 MG Tab; Take 1 Tab by mouth every day.  Dispense: 100 Tab; Refill: 4    2. Essential hypertension  - lisinopril (PRINIVIL) 5 MG Tab; Take 1 Tab by mouth every day.  Dispense: 100 Tab; Refill: 4    3. Hypothyroidism, unspecified type  - levothyroxine (SYNTHROID) 50 MCG Tab; Take 1 Tab by mouth every day.  Dispense: 100 Tab; Refill: 4  - TSH; Future    4. Type 2 diabetes mellitus without complication, without long-term current use of insulin (HCC)  - metFORMIN ER (GLUCOPHAGE XR) 500 MG TABLET SR 24 HR; Take 1 Tab by mouth every day.  Dispense: 100 Tab; Refill: 4  - HEMOGLOBIN A1C; Future  - MICROALBUMIN CREAT RATIO URINE; Future    5. Chronic left shoulder pain  - REFERRAL TO PHYSICAL THERAPY Reason for Therapy: Eval/Treat/Report        Followup: Return in about 3 months (around 11/15/2019), or if symptoms worsen or fail to improve, for DM.         "

## 2019-08-15 NOTE — ASSESSMENT & PLAN NOTE
Patient continues to complain of mild severity left shoulder pain worse when he lifts his arm above his head.  X-rays show arthritis.  He is not currently interested in joint injection, however, he would be interested in physical therapy

## 2019-09-04 ENCOUNTER — PHYSICAL THERAPY (OUTPATIENT)
Dept: PHYSICAL THERAPY | Facility: MEDICAL CENTER | Age: 74
End: 2019-09-04
Attending: FAMILY MEDICINE
Payer: MEDICARE

## 2019-09-04 DIAGNOSIS — M25.512 CHRONIC LEFT SHOULDER PAIN: ICD-10-CM

## 2019-09-04 DIAGNOSIS — G89.29 CHRONIC LEFT SHOULDER PAIN: ICD-10-CM

## 2019-09-04 PROCEDURE — 97161 PT EVAL LOW COMPLEX 20 MIN: CPT

## 2019-09-04 ASSESSMENT — ENCOUNTER SYMPTOMS
PAIN SCALE: 4
PAIN SCALE AT HIGHEST: 6
QUALITY: ACHING
PAIN SCALE AT LOWEST: 2

## 2019-09-04 NOTE — OP THERAPY EVALUATION
Outpatient Physical Therapy  INITIAL EVALUATION    [facility] Outpatient Physical Therapy  [address]  [place] [address]  Phone:  [phone]  Fax:  [phone]    Date of Evaluation: 2019    Patient: Benedict Marmolejo  YOB: 1945  MRN: [id number]     Referring Provider: Myles Strong M.D.  [address] [facility] [facility]  [address]  [place] [address]   Referring Diagnosis Chronic left shoulder pain [M25.512, G89.29]     Time Calculation  Start time: 024  Stop time: 0345 Time Calculation (min): 60 minutes       Physical Therapy Occurrence Codes    Date of onset of impairment:  13   Date physical therapy care plan established or reviewed:  19   Date physical therapy treatment started:  19          Chief Complaint: Shoulder Problem    Visit Diagnoses     ICD-10-CM   1. Chronic left shoulder pain M25.512    G89.29         Subjective   History of Present Illness:     Date of onset:  2013    History of chief complaint:  Pt injured his left shoulder 10 years ago but it healed. The last 6 years he's been having pain in both shoulders L>R. No n/t, no neck pain.     Pain:     Current pain ratin    At best pain ratin    At worst pain ratin    Location:  Pain in anterior deltoids    Quality:  Aching    Aggravating factors:  Left sidelying, overhead activities, putting jacket on.     Relieving factors:  Avoid aggravating factors, topical ointments.    Progression:  Worsening    Activity Tolerance:     Current activity tolerance / Recreational activities:  Rides bike, they have gym.     Work:  Retired .     Social Support:     Lives in:  Multiple-level home    Lives with:  Spouse    Hand Dominance:     Hand dominance:  Right    Patient Goals:     Patient goals for therapy:  Decrease pain and be able to sleep on his side.       Past Medical History:   Diagnosis Date   • Diabetes (HCC)    • Hyperlipidemia    • Hypertension     • Thyroid disease      Past Surgical History:   Procedure Laterality Date   • EYE SURGERY         Precautions:       Objective   Observation and functional movement:  Pt has head forward posture and protracted scapulae.     Range of motion and strength:    Limited mobility bilateral shoulders: left flexion 128, right 135, ER left 49, right 75.   MMT: grossly 3+/5, left ER 3-/5 and painful.     Sensation and reflexes:     Sensation is intact.      Reflexes are normal and symmetrical.    Palpation and joint mobility:     Pt has decreased mobility left GH joint.     Special tests:      Positive impingement tests bilaterally.             Therapeutic Exercises (CPT 17770):     1. Pt started oN : wall flexion slides, mid rows, pull downs and flasher      Therapeutic Exercise Summary: : [email or web address]   URL: [email or web address]   Date: 09/04/2019   Prepared by: Nancy Rodriguez     Exercises  · Shoulder Extension with Resistance - 10 reps - 3 sets - 1x daily - 7x weekly  · Standing Shoulder Row with Anchored Resistance - 10 reps - 3 sets - 1x daily - 7x weekly  · Shoulder External Rotation and Scapular Retraction with Resistance - 10 reps - 3 sets - 1x daily - 7x weekly  · Shoulder Flexion Wall Walk - 10 reps - 3 sets - 1x daily - 7x weekly      Therapeutic Treatments and Modalities:     Therapeutic Treatment and Modalities Summary: Access Code    Time-based treatments/modalities:          Assessment, Response and Plan:   Impairments: impaired physical strength, lacks appropriate home exercise program, limited mobility and pain with function    Assessment details:  Mr. Marmolejo is a pleasant 73 year old male with chronic bilateral shoulder pain. Pt appears to have previous rotator cuff injury on left and probable impingement on the right side. Pt would benefit from skilled PT to address pain and dysfunction.   Prognosis: good    Goals:   Short Term Goals:   Pt will be compliant with daily HEP.  Pt will demonstrate better  posture.    Short term goal time span:  2-4 weeks      Long Term Goals:    Improve tolerance to overhead activities.  Increase MMT to 5/5.  Pt independent with gym program.  Pt able to sleep on his sides.   Quick dash to improve by 50%  Long term goal time span:  6-8 weeks    Plan:   Therapy options:  Physical therapy treatment to continue  Planned therapy interventions:  E Stim Unattended (CPT 48994), Therapeutic Exercise (CPT 16537) and Manual Therapy (CPT 31773)  Frequency:  2x week  Duration in weeks:  6  Discussed with:  Patient      Functional Assessment Used  Quickdash General Total Score: 34.09     Referring provider co-signature:  I have reviewed this plan of care and my co-signature certifies the need for services.  Certification Dates:   From 09/04/19     To 11-4-19    Physician Signature: ________________________________ Date: ______________

## 2019-09-06 ENCOUNTER — PHYSICAL THERAPY (OUTPATIENT)
Dept: PHYSICAL THERAPY | Facility: MEDICAL CENTER | Age: 74
End: 2019-09-06
Attending: FAMILY MEDICINE
Payer: MEDICARE

## 2019-09-06 DIAGNOSIS — G89.29 CHRONIC LEFT SHOULDER PAIN: ICD-10-CM

## 2019-09-06 DIAGNOSIS — M25.512 CHRONIC LEFT SHOULDER PAIN: ICD-10-CM

## 2019-09-06 PROCEDURE — 97140 MANUAL THERAPY 1/> REGIONS: CPT

## 2019-09-06 PROCEDURE — 97110 THERAPEUTIC EXERCISES: CPT

## 2019-09-06 NOTE — OP THERAPY DAILY TREATMENT
Outpatient Physical Therapy  DAILY TREATMENT     [facility] Outpatient Physical Therapy  [address]  [place] [address]  Phone:  [phone]  Fax:  [phone]    Date: 09/06/2019    Patient: Benedict Marmolejo  YOB: 1945  MRN: [id number]     Time Calculation  Start time: 0140  Stop time: 0217 Time Calculation (min): 37 minutes       Chief Complaint: shoulder pain  Visit #: 2    SUBJECTIVE:  No c/o with HEP.    OBJECTIVE:            Therapeutic Exercises (CPT 08297):     1. UBE, 2 FWD AND 2 BKWD    2. Foam roller: alt flexion, pec stretch,     3. Prone angels    4. Horizontal abduction      Therapeutic Exercise Summary: Access Code: [email or web address]   URL: [email or web address]   Date: 09/06/2019   Prepared by: Nancy Shadle     Exercises  · Thoracic Foam Roll Mobilization Backstroke - 10 reps - 3 sets - 1x daily - 7x weekly  · Hooklying Arms at 90/90 on Foam Roll - 10 reps - 3 sets - 1x daily - 7x weekly  · Hooklying Scapular Protraction on Foam Roll - 10 reps - 3 sets - 1x daily - 7x weekly      Therapeutic Treatments and Modalities:     1. Manual Therapy (CPT 16080), passive GH mobs, t/s mobs, 1st rib mobs    Time-based treatments/modalities:  Manual therapy minutes (CPT 15397): 20 minutes  Therapeutic exercise minutes (CPT 99134): 16 minutes           ASSESSMENT:   Pt had difficulty with prone angels and keeping left shoulder from internally rotating.     PLAN/RECOMMENDATIONS:   Plan for treatment: therapy treatment to continue next visit.  Planned interventions for next visit: E-stim unattended (CPT 95364), manual therapy (CPT 50731) and therapeutic exercise (CPT 17394).

## 2019-09-10 ENCOUNTER — PHYSICAL THERAPY (OUTPATIENT)
Dept: PHYSICAL THERAPY | Facility: MEDICAL CENTER | Age: 74
End: 2019-09-10
Attending: FAMILY MEDICINE
Payer: MEDICARE

## 2019-09-10 DIAGNOSIS — M25.512 CHRONIC LEFT SHOULDER PAIN: ICD-10-CM

## 2019-09-10 DIAGNOSIS — G89.29 CHRONIC LEFT SHOULDER PAIN: ICD-10-CM

## 2019-09-10 PROCEDURE — 97110 THERAPEUTIC EXERCISES: CPT

## 2019-09-10 NOTE — OP THERAPY DAILY TREATMENT
"  Outpatient Physical Therapy  DAILY TREATMENT     [facility] Outpatient Physical Therapy  [address]  [place] [address]  Phone:  [phone]  Fax:  [phone]    Date: 09/10/2019    Patient: Benedict Marmolejo  YOB: 1945  MRN: [id number]     Time Calculation  Start time: 1500  Stop time: 1530 Time Calculation (min): 30 minutes       Chief Complaint: shoulder pain  Visit #: 3    SUBJECTIVE:  Pt \"Al\" states no real change in his shoulder pain. Exercises are fine. Pain is worst with sleeping and raising his arm out to the side.     Pt's wife, Cherelle, is present for today's session.    OBJECTIVE:    PROM>AROM for all shoulder ROM  Compensates with wrist extension/supination with ER AROM      Therapeutic Exercises (CPT 27455):     1. UBE, 2 min forward/2 min back, level 1    2. Scap + ER , pink, 10 x 1, VCs for increased scap retraction    3. Scap + ER on wall, pink, 10 x 1, iso for L, movement on R    4. GH ER AROM and iso (walk outs), pink, 10 x 1 each    5. Wall press, 20\" x 3, towel behind head for improved chin tuck    6. Thoracic mobs on foam roll, 20\" x 3      Therapeutic Exercise Summary: Access Code: [email or web address]   URL: [email or web address]   Date: 09/06/2019   Prepared by: Nancy Rodriguez     Exercises  · Thoracic Foam Roll Mobilization Backstroke - 10 reps - 3 sets - 1x daily - 7x weekly  · Hooklying Arms at 90/90 on Foam Roll - 10 reps - 3 sets - 1x daily - 7x weekly  · Hooklying Scapular Protraction on Foam Roll - 10 reps - 3 sets - 1x daily - 7x weekly      Therapeutic Treatments and Modalities:     1. Manual Therapy (CPT 54901), GH distraction in open chain and at 90 deg flexion, grade 3, 20\" x 3, no change in symptoms/ROM    Time-based treatments/modalities:  Manual therapy minutes (CPT 94159): 5 minutes  Therapeutic exercise minutes (CPT 86614): 25 minutes           ASSESSMENT:   Pt compensates with shoulder abduction and wrist extensin/supination " when performing resisted AROM into ER, but can correct with cueing.     PLAN/RECOMMENDATIONS:   Plan for treatment: therapy treatment to continue next visit.  Planned interventions for next visit: E-stim unattended (CPT 41846), manual therapy (CPT 58178) and therapeutic exercise (CPT 24877).

## 2019-09-12 ENCOUNTER — PHYSICAL THERAPY (OUTPATIENT)
Dept: PHYSICAL THERAPY | Facility: MEDICAL CENTER | Age: 74
End: 2019-09-12
Attending: FAMILY MEDICINE
Payer: MEDICARE

## 2019-09-12 DIAGNOSIS — M25.512 CHRONIC LEFT SHOULDER PAIN: ICD-10-CM

## 2019-09-12 DIAGNOSIS — G89.29 CHRONIC LEFT SHOULDER PAIN: ICD-10-CM

## 2019-09-12 PROCEDURE — 97110 THERAPEUTIC EXERCISES: CPT

## 2019-09-12 NOTE — OP THERAPY DAILY TREATMENT
Outpatient Physical Therapy  DAILY TREATMENT     [facility] Outpatient Physical Therapy  [address]  [place] [address]  Phone:  [phone]  Fax:  [phone]    Date: 09/12/2019    Patient: Benedict Marmolejo  YOB: 1945  MRN: [id number]     Time Calculation  Start time: 1400  Stop time: 1436 Time Calculation (min): 36 minutes       Chief Complaint: shoulder pain  Visit #: 4    SUBJECTIVE:  Pt reports no real change in his shoulder pain. Exercises are fine.   Pain with sleeping on left side only but that is painful.    Pt's wife, Cherelle, present for today's session.    OBJECTIVE:       shoulder flex 165  Shoulder abd 120 (pain with concentric and eccentric motion)        Therapeutic Exercises (CPT 34676):     1. UBE, 1 min fwd 1 min back    2. Sidelying ER, 10    3. Sidelying flexion, 10, dust a table    4. Supine scap punch, 10    5. Prone shoulder ext, 10    6. Prone horiz abd, 10    7. Nu step, 3 min, S10 A11 R4    8. Prone row, 10    9. Scap depression, 10, L2      Therapeutic Exercise Summary: Access Code: [email or web address]   URL: [email or web address]   Date: 09/06/2019   Prepared by: Nancy Rodriguez     Exercises  · Thoracic Foam Roll Mobilization Backstroke - 10 reps - 3 sets - 1x daily - 7x weekly  · Hooklying Arms at 90/90 on Foam Roll - 10 reps - 3 sets - 1x daily - 7x weekly  · Hooklying Scapular Protraction on Foam Roll - 10 reps - 3 sets - 1x daily - 7x weekly      Therapeutic Treatments and Modalities:     1. Manual Therapy (CPT 40834), GH mobs, 1st rib mobs, UPA L T1-4 gr III mobs    Time-based treatments/modalities:  Manual therapy minutes (CPT 68524): 10 minutes  Therapeutic exercise minutes (CPT 70420): 26 minutes           ASSESSMENT:   Pt flex increased to 175 without pain after exercise    PLAN/RECOMMENDATIONS:   Plan for treatment: therapy treatment to continue next visit.  Planned interventions for next visit: E-stim unattended (CPT 98869),  manual therapy (CPT 24441) and therapeutic exercise (CPT 19747).

## 2019-09-24 ENCOUNTER — PHYSICAL THERAPY (OUTPATIENT)
Dept: PHYSICAL THERAPY | Facility: MEDICAL CENTER | Age: 74
End: 2019-09-24
Attending: FAMILY MEDICINE
Payer: MEDICARE

## 2019-09-24 DIAGNOSIS — M25.512 CHRONIC LEFT SHOULDER PAIN: ICD-10-CM

## 2019-09-24 DIAGNOSIS — G89.29 CHRONIC LEFT SHOULDER PAIN: ICD-10-CM

## 2019-09-24 PROCEDURE — 97140 MANUAL THERAPY 1/> REGIONS: CPT

## 2019-09-24 PROCEDURE — 97110 THERAPEUTIC EXERCISES: CPT

## 2019-09-24 NOTE — OP THERAPY DAILY TREATMENT
Outpatient Physical Therapy  DAILY TREATMENT     [facility] Outpatient Physical Therapy  [address]  [place] [address]  Phone:  [phone]  Fax:  [phone]    Date: 09/24/2019    Patient: Benedict Marmolejo  YOB: 1945  MRN: [id number]     Time Calculation  Start time: 0238  Stop time: 0314 Time Calculation (min): 36 minutes       Chief Complaint: shoulder pain  Visit #: 5    SUBJECTIVE:  Pt had increased pain for several days post last treatment. Pt would like to do exercises that won't flare him up.     OBJECTIVE:            Therapeutic Exercises (CPT 64782):     1. UBE, 2 FWD AND 2 BKWD    2. Foam roller: alt flexion, pec stretch,     3. Prone angels    4. Horizontal abduction    Therapeutic Treatments and Modalities:     1. Manual Therapy (CPT 74210), passive GH mobs, t/s mobs, 1st rib mobs    Time-based treatments/modalities:  Manual therapy minutes (CPT 35262): 15 minutes  Therapeutic exercise minutes (CPT 51146): 20 minutes           ASSESSMENT:   Pt appears to have SS irritation on right side. Pt would like to work on his exercises independently and return in 3 weeks or so for last visit.     PLAN/RECOMMENDATIONS:   Plan for treatment: therapy treatment to continue next visit.  Planned interventions for next visit: E-stim unattended (CPT 84288), manual therapy (CPT 39701) and therapeutic exercise (CPT 47138).

## 2019-10-07 ENCOUNTER — OFFICE VISIT (OUTPATIENT)
Dept: URGENT CARE | Facility: CLINIC | Age: 74
End: 2019-10-07
Payer: MEDICARE

## 2019-10-07 ENCOUNTER — APPOINTMENT (OUTPATIENT)
Dept: RADIOLOGY | Facility: IMAGING CENTER | Age: 74
End: 2019-10-07
Attending: NURSE PRACTITIONER
Payer: MEDICARE

## 2019-10-07 VITALS
HEART RATE: 88 BPM | HEIGHT: 69 IN | BODY MASS INDEX: 32.58 KG/M2 | RESPIRATION RATE: 14 BRPM | WEIGHT: 220 LBS | SYSTOLIC BLOOD PRESSURE: 140 MMHG | OXYGEN SATURATION: 95 % | TEMPERATURE: 98.4 F | DIASTOLIC BLOOD PRESSURE: 86 MMHG

## 2019-10-07 DIAGNOSIS — M79.675 TOE PAIN, LEFT: ICD-10-CM

## 2019-10-07 DIAGNOSIS — S93.105A DISLOCATION OF PHALANX OF LEFT FOOT, INITIAL ENCOUNTER: ICD-10-CM

## 2019-10-07 PROCEDURE — 28660 TREAT TOE DISLOCATION: CPT | Mod: 54,T4 | Performed by: NURSE PRACTITIONER

## 2019-10-07 PROCEDURE — 73660 X-RAY EXAM OF TOE(S): CPT | Mod: TC,LT | Performed by: NURSE PRACTITIONER

## 2019-10-07 ASSESSMENT — ENCOUNTER SYMPTOMS
SENSORY CHANGE: 0
TINGLING: 0

## 2019-10-07 NOTE — PROGRESS NOTES
Subjective:      Benedict Marmolejo is a 73 y.o. male who presents with Toe Injury (Onset 3 hours ago, getting dressed, left foot, pinky toe got caught while the patient was placing undeear on. C/O painful to touch, hurts when walking or bearing weight on left foot. The pinky toe popped and crooked.)            HPI New. 73 year old male with left pinky toe injury after getting it caught on clothing. He reports both pain and deformity after hearing and feeling a pop and then pain. Denies foot pain or numbness/tingling. He has not done any treatment for this issue. Thinks it might be dislocated.  Patient has no known allergies.  Current Outpatient Medications on File Prior to Visit   Medication Sig Dispense Refill   • atorvastatin (LIPITOR) 20 MG Tab Take 1 Tab by mouth every day. 100 Tab 4   • levothyroxine (SYNTHROID) 50 MCG Tab Take 1 Tab by mouth every day. 100 Tab 4   • lisinopril (PRINIVIL) 5 MG Tab Take 1 Tab by mouth every day. 100 Tab 4   • metFORMIN ER (GLUCOPHAGE XR) 500 MG TABLET SR 24 HR Take 1 Tab by mouth every day. 100 Tab 4   • aspirin EC (ECOTRIN) 81 MG Tablet Delayed Response Take 1 Tab by mouth every day. 90 Tab 4   • ciclopirox (CICLODAN) 8 % solution Apply a thin layer to affected nails once daily. Clean off with alcohol once weekly. (Patient not taking: Reported on 10/7/2019) 1 Bottle 6     No current facility-administered medications on file prior to visit.      Social History     Socioeconomic History   • Marital status:      Spouse name: Not on file   • Number of children: Not on file   • Years of education: Not on file   • Highest education level: Not on file   Occupational History   • Not on file   Social Needs   • Financial resource strain: Not on file   • Food insecurity:     Worry: Not on file     Inability: Not on file   • Transportation needs:     Medical: Not on file     Non-medical: Not on file   Tobacco Use   • Smoking status: Former Smoker     Last attempt to quit: 1990   "    Years since quittin.7   • Smokeless tobacco: Never Used   Substance and Sexual Activity   • Alcohol use: Yes     Alcohol/week: 1.2 - 1.8 oz     Types: 2 - 3 Glasses of wine per week     Binge frequency: Daily or almost daily   • Drug use: No   • Sexual activity: Never   Lifestyle   • Physical activity:     Days per week: Not on file     Minutes per session: Not on file   • Stress: Not on file   Relationships   • Social connections:     Talks on phone: Not on file     Gets together: Not on file     Attends [employer] service: Not on file     Active member of club or organization: Not on file     Attends meetings of clubs or organizations: Not on file     Relationship status: Not on file   • Intimate partner violence:     Fear of current or ex partner: Not on file     Emotionally abused: Not on file     Physically abused: Not on file     Forced sexual activity: Not on file   Other Topics Concern   • Not on file   Social History Narrative   • Not on file     Family history is unknown by patient.      Review of Systems   Musculoskeletal: Positive for joint pain.   Skin:        No abrasions or bruising noted.   Neurological: Negative for tingling and sensory change.          Objective:     /86 (BP Location: Left arm, Patient Position: Sitting, BP Cuff Size: Adult)   Pulse 88   Temp 36.9 °C (98.4 °F) (Temporal)   Resp 14   Ht 1.753 m (5' 9\")   Wt 99.8 kg (220 lb)   SpO2 95%   BMI 32.49 kg/m²      Physical Exam   Constitutional: He is oriented to person, place, and time. He appears well-developed. No distress.   Cardiovascular: Normal rate, regular rhythm and normal heart sounds.   No murmur heard.  Pulmonary/Chest: Effort normal and breath sounds normal. No respiratory distress.   Musculoskeletal:        Left foot: There is decreased range of motion, tenderness, bony tenderness and deformity.        Feet:    Neurological: He is alert and oriented to person, place, and time.   Skin: Skin is warm and " dry.   Psychiatric: He has a normal mood and affect. His behavior is normal. Thought content normal.   Nursing note and vitals reviewed.         Patient's toe reduced with traction without any complications on first attempt.      Assessment/Plan:     1. Toe pain, left  DX-TOE(S) 2+ LEFT   2. Dislocation of phalanx of left foot, initial encounter         Differential diagnosis, natural history, supportive care, and indications for immediate follow-up discussed at length.

## 2019-10-09 ENCOUNTER — HOSPITAL ENCOUNTER (OUTPATIENT)
Dept: LAB | Facility: MEDICAL CENTER | Age: 74
End: 2019-10-09
Attending: FAMILY MEDICINE
Payer: MEDICARE

## 2019-10-09 DIAGNOSIS — E11.9 TYPE 2 DIABETES MELLITUS WITHOUT COMPLICATION, WITHOUT LONG-TERM CURRENT USE OF INSULIN (HCC): ICD-10-CM

## 2019-10-09 DIAGNOSIS — E03.9 HYPOTHYROIDISM, UNSPECIFIED TYPE: ICD-10-CM

## 2019-10-09 LAB
CREAT UR-MCNC: 198.9 MG/DL
EST. AVERAGE GLUCOSE BLD GHB EST-MCNC: 117 MG/DL
HBA1C MFR BLD: 5.7 % (ref 0–5.6)
MICROALBUMIN UR-MCNC: <0.7 MG/DL
MICROALBUMIN/CREAT UR: NORMAL MG/G (ref 0–30)

## 2019-10-09 PROCEDURE — 84443 ASSAY THYROID STIM HORMONE: CPT

## 2019-10-09 PROCEDURE — 83036 HEMOGLOBIN GLYCOSYLATED A1C: CPT | Mod: GA

## 2019-10-09 PROCEDURE — 82570 ASSAY OF URINE CREATININE: CPT

## 2019-10-09 PROCEDURE — 36415 COLL VENOUS BLD VENIPUNCTURE: CPT | Mod: GA

## 2019-10-09 PROCEDURE — 82043 UR ALBUMIN QUANTITATIVE: CPT

## 2019-10-10 LAB — TSH SERPL DL<=0.005 MIU/L-ACNC: 3.77 UIU/ML (ref 0.38–5.33)

## 2019-12-09 ENCOUNTER — OFFICE VISIT (OUTPATIENT)
Dept: MEDICAL GROUP | Facility: MEDICAL CENTER | Age: 74
End: 2019-12-09
Payer: MEDICARE

## 2019-12-09 VITALS
WEIGHT: 218 LBS | SYSTOLIC BLOOD PRESSURE: 138 MMHG | TEMPERATURE: 98.1 F | HEIGHT: 69 IN | DIASTOLIC BLOOD PRESSURE: 82 MMHG | BODY MASS INDEX: 32.29 KG/M2 | RESPIRATION RATE: 20 BRPM | OXYGEN SATURATION: 97 % | HEART RATE: 88 BPM

## 2019-12-09 DIAGNOSIS — M25.512 CHRONIC LEFT SHOULDER PAIN: ICD-10-CM

## 2019-12-09 DIAGNOSIS — Z00.00 HEALTHCARE MAINTENANCE: ICD-10-CM

## 2019-12-09 DIAGNOSIS — R07.9 CHEST PAIN, UNSPECIFIED TYPE: ICD-10-CM

## 2019-12-09 DIAGNOSIS — I10 ESSENTIAL HYPERTENSION: ICD-10-CM

## 2019-12-09 DIAGNOSIS — E11.9 TYPE 2 DIABETES MELLITUS WITHOUT COMPLICATION, WITHOUT LONG-TERM CURRENT USE OF INSULIN (HCC): ICD-10-CM

## 2019-12-09 DIAGNOSIS — E78.5 DYSLIPIDEMIA: ICD-10-CM

## 2019-12-09 DIAGNOSIS — Z12.11 COLON CANCER SCREENING: ICD-10-CM

## 2019-12-09 DIAGNOSIS — G89.29 CHRONIC LEFT SHOULDER PAIN: ICD-10-CM

## 2019-12-09 PROCEDURE — 99214 OFFICE O/P EST MOD 30 MIN: CPT | Performed by: FAMILY MEDICINE

## 2019-12-09 PROCEDURE — 93000 ELECTROCARDIOGRAM COMPLETE: CPT | Performed by: FAMILY MEDICINE

## 2019-12-09 NOTE — ASSESSMENT & PLAN NOTE
Hypoglycemic therapy: Metformin  daily.   Last A1c: 10/9/19 was 5.7.   Aspirin:  Taking.   Statin:  Taking.   ACE:  Taking.   Urine Microalbumin: done 2019 and normal.   Monofilament Exam:  Done 04/11/19.   Retinal Screening:  Referred 04/11/19.   Pneumonia vaccine:  Received.

## 2019-12-09 NOTE — PROGRESS NOTES
[facility]  Progress Note  Established Patient    Subjective:   Benedict Marmolejo is a 74 y.o. male here today with a chief complaint of diabetes. The patient is alone.     Healthcare maintenance  Lipids: on atorvastatin.   Fasting Glucose: has DM.   Hepatitis C Screen: done 2018 and negative.   FIT: ordered.    Pneumonia vaccine: Prevnar given 2017, Pneumovax given 2018.     Chest pain  Patient states that for a long period of time he has had transient chest pains.  Over the past few weeks, however, they are becoming more frequent.  He states that up to 10-15 times per day he would be a transient, fleeting, sharp, right-sided chest pain.  It is sometimes made worse with breathing in.  It usually occurs at rest.  He denies associated shortness of breath, radiation of the pain, nausea or palpitations.  He denies dizziness.  He does have a history of diabetes.    Type 2 diabetes mellitus without complication, without long-term current use of insulin ([facility])  Hypoglycemic therapy: Metformin  daily.   Last A1c: 10/9/19 was 5.7.   Aspirin:  Taking.   Statin:  Taking.   ACE:  Taking.   Urine Microalbumin: done 2019 and normal.   Monofilament Exam:  Done 04/11/19.   Retinal Screening:  Referred 04/11/19.   Pneumonia vaccine:  Received.     Left shoulder pain  The last visit the patient continued describe a left shoulder pain with radiographic evidence of arthritis.  I sent him for physical therapy but he states this did not really help.    Essential hypertension  The patient's blood pressure is at goal.     Dyslipidemia  The patient's DLD is controlled with atorvastatin.       Current Outpatient Medications on File Prior to Visit   Medication Sig Dispense Refill   • atorvastatin (LIPITOR) 20 MG Tab Take 1 Tab by mouth every day. 100 Tab 4   • levothyroxine (SYNTHROID) 50 MCG Tab Take 1 Tab by mouth every day. 100 Tab 4   • lisinopril (PRINIVIL) 5 MG Tab Take 1 Tab by mouth every day. 100 Tab 4   •  "ciclopirox (CICLODAN) 8 % solution Apply a thin layer to affected nails once daily. Clean off with alcohol once weekly. 1 Bottle 6   • aspirin EC (ECOTRIN) 81 MG Tablet Delayed Response Take 1 Tab by mouth every day. 90 Tab 4     No current facility-administered medications on file prior to visit.        Past Medical History:   Diagnosis Date   • Diabetes (HCC)    • Hyperlipidemia    • Hypertension    • Thyroid disease        Allergies: Patient has no known allergies.    Surgical History:  has a past surgical history that includes eye surgery.    Family History: Family history is unknown by patient.    Social History:  reports that he quit smoking about 29 years ago. He has never used smokeless tobacco. He reports current alcohol use of about 1.2 - 1.8 oz of alcohol per week. He reports that he does not use drugs.    ROS: no SOB, no fever.        Objective:     Vitals:    12/09/19 1459   BP: 138/82   BP Location: Left arm   Patient Position: Sitting   BP Cuff Size: Adult   Pulse: 88   Resp: 20   Temp: 36.7 °C (98.1 °F)   TempSrc: Temporal   SpO2: 97%   Weight: 98.9 kg (218 lb)   Height: 1.753 m (5' 9\")       Physical Exam:  General: alert in no apparent distress.   Cardio: regular rate and rhythm, no murmurs, rubs or gallops.   Resp: CTAB no w/r/r.   MSK: no chest wall tenderness. No TTP of deep venous system of either leg bilaterally. Negative Otilio bilaterally.         Assessment and Plan:     1. Type 2 diabetes mellitus without complication, without long-term current use of insulin ([facility])  - stop metformin, labs in 4 months.   - HEMOGLOBIN A1C; Future    2. Chronic left shoulder pain  - offered joint injection, patient declines.     3. Essential hypertension  - continue current regimen.     4. Dyslipidemia  - continue statin.     5. Healthcare maintenance  - see HPI.     6. Colon cancer screening  - OCCULT BLOOD FECES IMMUNOASSAY; Future    7. Chest pain, unspecified type  Patient's EKG shows sinus arrhythmia but " is otherwise reassuring.  The readout does suggest Q waves in the precordial leads, however, these do not appear to be true Q waves and so I do not see any evidence of ischemia or infarction on this EKG. Will proceed with following workup, though character of the pain sounds fairly benign, such as precordial catch. The patient is a relatively high risk patient considering his diabetes, however.  - DX-CHEST-2 VIEWS; Future  - D-DIMER; Future  - NM-CARDIAC STRESS TEST; Future    Followup: Return in about 4 months (around 4/9/2020), or if symptoms worsen or fail to improve, for DM.

## 2019-12-09 NOTE — ASSESSMENT & PLAN NOTE
Patient states that for a long period of time he has had transient chest pains.  Over the past few weeks, however, they are becoming more frequent.  He states that up to 10-15 times per day he would be a transient, fleeting, sharp, right-sided chest pain.  It is sometimes made worse with breathing in.  It usually occurs at rest.  He denies associated shortness of breath, radiation of the pain, nausea or palpitations.  He denies dizziness.  He does have a history of diabetes.

## 2019-12-09 NOTE — ASSESSMENT & PLAN NOTE
The last visit the patient continued describe a left shoulder pain with radiographic evidence of arthritis.  I sent him for physical therapy but he states this did not really help.

## 2019-12-09 NOTE — ASSESSMENT & PLAN NOTE
Lipids: on atorvastatin.   Fasting Glucose: has DM.   Hepatitis C Screen: done 2018 and negative.   FIT: ordered.    Pneumonia vaccine: Prevnar given 2017, Pneumovax given 2018.

## 2019-12-11 ENCOUNTER — HOSPITAL ENCOUNTER (OUTPATIENT)
Dept: LAB | Facility: MEDICAL CENTER | Age: 74
End: 2019-12-11
Attending: FAMILY MEDICINE
Payer: MEDICARE

## 2019-12-11 DIAGNOSIS — R07.9 CHEST PAIN, UNSPECIFIED TYPE: ICD-10-CM

## 2019-12-11 LAB — D DIMER PPP IA.FEU-MCNC: <0.4 UG/ML (FEU) (ref 0–0.5)

## 2019-12-11 PROCEDURE — 85379 FIBRIN DEGRADATION QUANT: CPT

## 2019-12-11 PROCEDURE — 36415 COLL VENOUS BLD VENIPUNCTURE: CPT

## 2020-06-17 ENCOUNTER — HOSPITAL ENCOUNTER (OUTPATIENT)
Dept: LAB | Facility: MEDICAL CENTER | Age: 75
End: 2020-06-17
Attending: FAMILY MEDICINE
Payer: MEDICARE

## 2020-06-17 DIAGNOSIS — E11.9 TYPE 2 DIABETES MELLITUS WITHOUT COMPLICATION, WITHOUT LONG-TERM CURRENT USE OF INSULIN (HCC): ICD-10-CM

## 2020-06-17 PROCEDURE — 36415 COLL VENOUS BLD VENIPUNCTURE: CPT

## 2020-06-17 PROCEDURE — 83036 HEMOGLOBIN GLYCOSYLATED A1C: CPT

## 2020-06-18 LAB
EST. AVERAGE GLUCOSE BLD GHB EST-MCNC: 120 MG/DL
HBA1C MFR BLD: 5.8 % (ref 0–5.6)

## 2020-07-02 ENCOUNTER — OFFICE VISIT (OUTPATIENT)
Dept: MEDICAL GROUP | Facility: MEDICAL CENTER | Age: 75
End: 2020-07-02
Payer: MEDICARE

## 2020-07-02 VITALS
BODY MASS INDEX: 32.88 KG/M2 | TEMPERATURE: 98.7 F | RESPIRATION RATE: 18 BRPM | HEIGHT: 69 IN | SYSTOLIC BLOOD PRESSURE: 118 MMHG | HEART RATE: 89 BPM | WEIGHT: 222 LBS | OXYGEN SATURATION: 96 % | DIASTOLIC BLOOD PRESSURE: 74 MMHG

## 2020-07-02 DIAGNOSIS — E11.9 TYPE 2 DIABETES MELLITUS WITHOUT COMPLICATION, WITHOUT LONG-TERM CURRENT USE OF INSULIN (HCC): ICD-10-CM

## 2020-07-02 DIAGNOSIS — I10 ESSENTIAL HYPERTENSION: ICD-10-CM

## 2020-07-02 DIAGNOSIS — E78.5 DYSLIPIDEMIA: ICD-10-CM

## 2020-07-02 DIAGNOSIS — E03.9 HYPOTHYROIDISM, UNSPECIFIED TYPE: ICD-10-CM

## 2020-07-02 DIAGNOSIS — H61.23 BILATERAL IMPACTED CERUMEN: ICD-10-CM

## 2020-07-02 PROCEDURE — 99214 OFFICE O/P EST MOD 30 MIN: CPT | Mod: 25 | Performed by: FAMILY MEDICINE

## 2020-07-02 PROCEDURE — 69210 REMOVE IMPACTED EAR WAX UNI: CPT | Performed by: FAMILY MEDICINE

## 2020-07-02 RX ORDER — ATORVASTATIN CALCIUM 20 MG/1
20 TABLET, FILM COATED ORAL DAILY
Qty: 100 TAB | Refills: 4 | Status: SHIPPED | OUTPATIENT
Start: 2020-07-02 | End: 2021-08-09

## 2020-07-02 RX ORDER — LEVOTHYROXINE SODIUM 0.05 MG/1
50 TABLET ORAL DAILY
Qty: 100 TAB | Refills: 4 | Status: SHIPPED | OUTPATIENT
Start: 2020-07-02 | End: 2021-09-13

## 2020-07-02 RX ORDER — LISINOPRIL 5 MG/1
5 TABLET ORAL DAILY
Qty: 100 TAB | Refills: 4 | Status: SHIPPED | OUTPATIENT
Start: 2020-07-02 | End: 2021-08-09

## 2020-07-02 ASSESSMENT — PATIENT HEALTH QUESTIONNAIRE - PHQ9: CLINICAL INTERPRETATION OF PHQ2 SCORE: 0

## 2020-07-02 NOTE — ASSESSMENT & PLAN NOTE
Patient states he has noticed some muffled hearing, worse on the right.  He believes that he may have cerumen impaction.  He tried over-the-counter remedies without success.

## 2020-07-02 NOTE — PROGRESS NOTES
[facility]  Progress Note  Established Patient    Subjective:   Benedict Marmolejo is a 74 y.o. male here today with a chief complaint of diabetes. The patient is alone.     Type 2 diabetes mellitus without complication, without long-term current use of insulin (HCC)  Patient's diabetes has resolved.    Hypothyroid  Patient's hypothyroidism is at goal on Synthroid.    Essential hypertension  Patient's blood pressure is at goal on lisinopril.    Dyslipidemia  Patient's dyslipidemia controlled with atorvastatin.    Bilateral impacted cerumen  Patient states he has noticed some muffled hearing, worse on the right.  He believes that he may have cerumen impaction.  He tried over-the-counter remedies without success.      Current Outpatient Medications on File Prior to Visit   Medication Sig Dispense Refill   • ciclopirox (CICLODAN) 8 % solution Apply a thin layer to affected nails once daily. Clean off with alcohol once weekly. 1 Bottle 6   • aspirin EC (ECOTRIN) 81 MG Tablet Delayed Response Take 1 Tab by mouth every day. 90 Tab 4     No current facility-administered medications on file prior to visit.        Past Medical History:   Diagnosis Date   • Diabetes (HCC)    • Hyperlipidemia    • Hypertension    • Thyroid disease        Allergies: Patient has no known allergies.    Surgical History:  has a past surgical history that includes eye surgery.    Family History: Family history is unknown by patient.    Social History:  reports that he quit smoking about 30 years ago. He has never used smokeless tobacco. He reports current alcohol use of about 1.2 - 1.8 oz of alcohol per week. He reports that he does not use drugs.    ROS: no fever or cough.        Objective:     Vitals:    07/02/20 1352   BP: 118/74   BP Location: Left arm   Patient Position: Sitting   BP Cuff Size: Large adult   Pulse: 89   Resp: 18   Temp: 37.1 °C (98.7 °F)   TempSrc: Temporal   SpO2: 96%   Weight: 100.7 kg (222 lb)   Height: 1.753  "m (5' 9\")       Physical Exam:  General: alert in no apparent distress.   Cardio: regular rate and rhythm, no murmurs, rubs or gallops.   Resp: CTAB no w/r/r.   ENMT: Bilateral cerumen impaction, worse on the right.  After removal, bilateral tympanic membranes are intact.    Cerumen Removal Procedure Note:  Ear lavage by the medical assistant failed on the R but succeeded on the left. Successful cerumen removal was achieved with alligator forceps on the right side. The patient tolerated the procedure well with no complications. Tympanic membranes was visualized after the procedure and was intact.          Assessment and Plan:     1. Type 2 diabetes mellitus without complication, without long-term current use of insulin (HCC)  - resolved.     2. Hypothyroidism, unspecified type  - TSH in f/u.   - levothyroxine (SYNTHROID) 50 MCG Tab; Take 1 Tab by mouth every day.  Dispense: 100 Tab; Refill: 4    3. Essential hypertension  - labs in f/u.   - lisinopril (PRINIVIL) 5 MG Tab; Take 1 Tab by mouth every day.  Dispense: 100 Tab; Refill: 4    4. Dyslipidemia  - labs in f/u.   - atorvastatin (LIPITOR) 20 MG Tab; Take 1 Tab by mouth every day.  Dispense: 100 Tab; Refill: 4    5. Bilateral impacted cerumen  - removed, see procedure note above.         Followup: Return in about 6 months (around 1/2/2021), or if symptoms worsen or fail to improve, for Wellness Visit, Long.         "

## 2020-10-05 NOTE — ASSESSMENT & PLAN NOTE
Patient continues to complain of bilateral finger pains.  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  I ordered some follow-up autoimmune testing but the patient has not gotten this done yet.   Dyspnea, unspecified type

## 2021-01-07 ENCOUNTER — OFFICE VISIT (OUTPATIENT)
Dept: MEDICAL GROUP | Facility: MEDICAL CENTER | Age: 76
End: 2021-01-07
Payer: MEDICARE

## 2021-01-07 VITALS
HEART RATE: 104 BPM | WEIGHT: 220.8 LBS | DIASTOLIC BLOOD PRESSURE: 74 MMHG | TEMPERATURE: 99.1 F | RESPIRATION RATE: 20 BRPM | BODY MASS INDEX: 32.7 KG/M2 | HEIGHT: 69 IN | OXYGEN SATURATION: 96 % | SYSTOLIC BLOOD PRESSURE: 124 MMHG

## 2021-01-07 DIAGNOSIS — Z13.6 SCREENING FOR ABDOMINAL AORTIC ANEURYSM: ICD-10-CM

## 2021-01-07 DIAGNOSIS — E03.9 HYPOTHYROIDISM, UNSPECIFIED TYPE: ICD-10-CM

## 2021-01-07 DIAGNOSIS — R35.1 NOCTURIA: ICD-10-CM

## 2021-01-07 DIAGNOSIS — R05.9 COUGH: ICD-10-CM

## 2021-01-07 DIAGNOSIS — I10 ESSENTIAL HYPERTENSION: ICD-10-CM

## 2021-01-07 DIAGNOSIS — Z00.00 HEALTHCARE MAINTENANCE: ICD-10-CM

## 2021-01-07 DIAGNOSIS — R07.9 CHEST PAIN, UNSPECIFIED TYPE: ICD-10-CM

## 2021-01-07 DIAGNOSIS — E78.5 DYSLIPIDEMIA: ICD-10-CM

## 2021-01-07 DIAGNOSIS — Z12.12 SCREENING FOR COLORECTAL CANCER: ICD-10-CM

## 2021-01-07 DIAGNOSIS — Z12.11 SCREENING FOR COLORECTAL CANCER: ICD-10-CM

## 2021-01-07 PROCEDURE — G0439 PPPS, SUBSEQ VISIT: HCPCS | Performed by: FAMILY MEDICINE

## 2021-01-07 PROCEDURE — 99999 PR NO CHARGE: CPT | Mod: 25 | Performed by: FAMILY MEDICINE

## 2021-01-07 RX ORDER — TAMSULOSIN HYDROCHLORIDE 0.4 MG/1
0.4 CAPSULE ORAL
Qty: 100 CAP | Refills: 4 | Status: SHIPPED | OUTPATIENT
Start: 2021-01-07 | End: 2022-02-07

## 2021-01-07 ASSESSMENT — ENCOUNTER SYMPTOMS: GENERAL WELL-BEING: GOOD

## 2021-01-07 ASSESSMENT — PATIENT HEALTH QUESTIONNAIRE - PHQ9
CLINICAL INTERPRETATION OF PHQ2 SCORE: 0
SUM OF ALL RESPONSES TO PHQ QUESTIONS 1-9: 0

## 2021-01-07 ASSESSMENT — ACTIVITIES OF DAILY LIVING (ADL): BATHING_REQUIRES_ASSISTANCE: 0

## 2021-01-07 NOTE — PROGRESS NOTES
Chief Complaint   Patient presents with   • Annual Wellness Visit   • Other     inability to empty bladder--worse at night.   • Cough     minor cough off/on x few mo           HPI:  Benedict is a 75 y.o. here for Medicare Annual Wellness Visit    Cough  Patient describes a few months of an intermittently productive and intermittent cough with no associated chest pain or shortness of breath.  Humidifier does not help.  He denies globus sensation.  Of note, the patient does have a past history of transient chest pains but these are not associated with this cough.  He also states that he has tried Flonase and this does seem to help.    Chest pain  Several months ago the patient described transient chest pains.  I sent him for a stress test but he never got this done.     Dyslipidemia  Patient dyslipidemia controlled with atorvastatin.    Essential hypertension  Patient blood pressure is at goal.    Healthcare maintenance  Lipids: on atorvastatin.   Fasting Glucose: has DM.   Hepatitis C Screen: done 2018 and negative.   FIT: ordered.  Ultrasound aorta: Ordered.    Pneumonia vaccine: Prevnar given 2017, Pneumovax given 2018.     Hypothyroid  Patient's hypothyroidism is controlled on Synthroid.    Nocturia  Patient describes several years of worsening nocturia associated with difficulty emptying the bladder.  He denies dysuria.        Patient Active Problem List    Diagnosis Date Noted   • Cough 01/07/2021   • Nocturia 01/07/2021   • Bilateral impacted cerumen 07/02/2020   • Healthcare maintenance 12/09/2019   • Chest pain 12/09/2019   • Finger pain 11/15/2018   • Sleep paralysis 11/15/2018   • Left shoulder pain 11/15/2018   • Onychomycosis 11/15/2018   • Dyslipidemia 11/15/2018   • Hypothyroid 11/15/2018   • Essential hypertension 11/15/2018       Current Outpatient Medications   Medication Sig Dispense Refill   • VITAMIN D PO Take 2,000 Int'l Units by mouth every 48 hours.     • tamsulosin (FLOMAX) 0.4 MG capsule Take 1  Cap by mouth 1/2 hour after breakfast. 100 Cap 4   • atorvastatin (LIPITOR) 20 MG Tab Take 1 Tab by mouth every day. 100 Tab 4   • levothyroxine (SYNTHROID) 50 MCG Tab Take 1 Tab by mouth every day. 100 Tab 4   • lisinopril (PRINIVIL) 5 MG Tab Take 1 Tab by mouth every day. 100 Tab 4   • ciclopirox (CICLODAN) 8 % solution Apply a thin layer to affected nails once daily. Clean off with alcohol once weekly. 1 Bottle 6   • aspirin EC (ECOTRIN) 81 MG Tablet Delayed Response Take 1 Tab by mouth every day. 90 Tab 4     No current facility-administered medications for this visit.         Patient is taking medications as noted in medication list.  Current supplements as per medication list.     Allergies: Patient has no known allergies.    Current social contact/activities: Pt. Stays in touch with family and friends. Pt.also does photography, reading and spends time with wife in the outdoors.     Is patient current with immunizations? No, due for TDAP and SHINGRIX (Shingles). Patient is interested in receiving NONE today.    He  reports that he quit smoking about 31 years ago. He has never used smokeless tobacco. He reports current alcohol use of about 1.2 - 1.8 oz of alcohol per week. He reports that he does not use drugs.  Counseling given: Not Answered        DPA/Advanced directive: Patient has Living Will, but it is not on file. Instructed to bring in a copy to scan into their chart.    ROS:    Gait: Uses no assistive device   Ostomy: No   Other tubes: No   Amputations: No   Chronic oxygen use No   Last eye exam a few mo ago.   Wears hearing aids: Yes   : Denies any urinary leakage during the last 6 months      Depression Screening    Little interest or pleasure in doing things?  0 - not at all  Feeling down, depressed, or hopeless? 0 - not at all  Patient Health Questionnaire Score: 0    If depressive symptoms identified deferred to follow up visit unless specifically addressed in assessment and  plan.    Interpretation of PHQ-9 Total Score   Score Severity   1-4 No Depression   5-9 Mild Depression   10-14 Moderate Depression   15-19 Moderately Severe Depression   20-27 Severe Depression    Screening for Cognitive Impairment    Three Minute Recall (river, nation, finger)  3/3    Bubba clock face with all 12 numbers and set the hands to show 10 past 11.  Yes 5/5  If cognitive concerns identified, deferred for follow up unless specifically addressed in assessment and plan.    Fall Risk Assessment    Has the patient had two or more falls in the last year or any fall with injury in the last year?  No  If fall risk identified, deferred for follow up unless specifically addressed in assessment and plan.    Safety Assessment    Throw rugs on floor.  No  Handrails on all stairs.  Yes  Good lighting in all hallways.  Yes  Difficulty hearing.  Yes  Patient counseled about all safety risks that were identified.    Functional Assessment ADLs    Are there any barriers preventing you from cooking for yourself or meeting nutritional needs?  No.    Are there any barriers preventing you from driving safely or obtaining transportation?  No.    Are there any barriers preventing you from using a telephone or calling for help?  No.    Are there any barriers preventing you from shopping?  No.    Are there any barriers preventing you from taking care of your own finances?  No.    Are there any barriers preventing you from managing your medications?  No.    Are there any barriers preventing you from showering, bathing or dressing yourself?  No.    Are you currently engaging in any exercise or physical activity?  Yes.  Pt. Walks when goes shopping.  Takes care of his home.  What is your perception of your health?  Good.    Health Maintenance Summary                Annual Wellness Visit Overdue 1945     COLON CANCER SCREENING ANNUAL FIT Overdue 11/16/1997     IMM ZOSTER VACCINES Overdue 11/16/1997     IMM DTaP/Tdap/Td Vaccine  "Postponed 2021 Originally 1966. Insurance/Financial          Patient Care Team:  Myles Strong M.D. as PCP - General (Family Medicine)  [facility] as Consulting Physician    Social History     Tobacco Use   • Smoking status: Former Smoker     Quit date:      Years since quittin.0   • Smokeless tobacco: Never Used   Substance Use Topics   • Alcohol use: Yes     Alcohol/week: 1.2 - 1.8 oz     Types: 2 - 3 Glasses of wine per week     Binge frequency: Daily or almost daily   • Drug use: No     Family History   Family history unknown: Yes     He  has a past medical history of Diabetes (HCC), Hyperlipidemia, Hypertension, and Thyroid disease.   Past Surgical History:   Procedure Laterality Date   • EYE SURGERY             Exam:     /74 (BP Location: Left arm, Patient Position: Sitting, BP Cuff Size: Adult long)   Pulse (!) 104   Temp 37.3 °C (99.1 °F) (Temporal)   Resp 20   Ht 1.74 m (5' 8.5\")   Wt 100.2 kg (220 lb 12.8 oz)   SpO2 96%  Body mass index is 33.08 kg/m².    Hearing poor.  Has hearing aids.   Dentition fair  Alert, oriented in no acute distress.  Eye contact is good, speech goal directed, affect calm  ENMT: No pharyngeal erythema or tonsillar exudates.  Uvula midline.  Cardiac: Regular rate and rhythm, no murmurs, rubs or gallops.  Respiratory: Clear to auscultation bilaterally.      Assessment and Plan. The following treatment and monitoring plan is recommended:      1. Screening for colorectal cancer  - COLOGUARD (FIT DNA)    2. Dyslipidemia  - continue statin.   - Comp Metabolic Panel; Future  - Lipid Profile; Future    3. Essential hypertension  - continue ACE.   - Comp Metabolic Panel; Future    4. Healthcare maintenance  - see HPI.     5. Hypothyroidism, unspecified type  - continue Synthroid.   - TSH WITH REFLEX TO FT4; Future    6. Cough  Believe this is postnasal drip.  It is unlikely Covid because its been going on for 2 months.  Even if it " were, the patient would be noninfectious at this point.  I recommended he use NeilMed sinus rinse at night followed by Flonase every other night.  I recommended he use Claritin in the morning.  If his cough has not 100 percent resolved within 3 weeks, I asked that he let me know at which point we would pursue additional testing.  - DX-CHEST-2 VIEWS; Future    7. Chest pain, unspecified type  Patient didn't get previously ordered stress test. Reordered, emphasized importance of getting this.   - DX-CHEST-2 VIEWS; Future  - NM-CARDIAC STRESS TEST; Future    8. Screening for abdominal aortic aneurysm  - US-ABDOMINAL AORTA W/O DOPPLER; Future    9. Nocturia  Recommended MARLENI.  Patient declines.  Will send for the following labs and will start Flomax for likely BPH.  - URINALYSIS,CULTURE IF INDICATED; Future  - PROSTATE SPECIFIC AG DIAGNOSTIC; Future  - tamsulosin (FLOMAX) 0.4 MG capsule; Take 1 Cap by mouth 1/2 hour after breakfast.  Dispense: 100 Cap; Refill: 4    Services suggested: No services needed at this time  Health Care Screening recommendations as per orders if indicated.  Referrals offered: PT/OT/Nutrition counseling/Behavioral Health/Smoking cessation as per orders if indicated.    Discussion today about general wellness and lifestyle habits:    · Prevent falls and reduce trip hazards; Cautioned about securing or removing rugs.  · Have a working fire alarm and carbon monoxide detector;   · Engage in regular physical activity and social activities       Follow-up: Return in about 6 months (around 7/7/2021), or if symptoms worsen or fail to improve.

## 2021-01-07 NOTE — ASSESSMENT & PLAN NOTE
Lipids: on atorvastatin.   Fasting Glucose: has DM.   Hepatitis C Screen: done 2018 and negative.   FIT: ordered.  Ultrasound aorta: Ordered.    Pneumonia vaccine: Prevnar given 2017, Pneumovax given 2018.

## 2021-01-07 NOTE — ASSESSMENT & PLAN NOTE
Patient describes several years of worsening nocturia associated with difficulty emptying the bladder.  He denies dysuria.

## 2021-01-07 NOTE — ASSESSMENT & PLAN NOTE
Several months ago the patient described transient chest pains.  I sent him for a stress test but he never got this done.

## 2021-01-15 DIAGNOSIS — Z23 NEED FOR VACCINATION: ICD-10-CM

## 2021-01-27 ENCOUNTER — APPOINTMENT (OUTPATIENT)
Dept: FAMILY PLANNING/WOMEN'S HEALTH CLINIC | Facility: IMMUNIZATION CENTER | Age: 76
End: 2021-01-27
Attending: INTERNAL MEDICINE
Payer: MEDICARE

## 2021-01-27 DIAGNOSIS — Z23 NEED FOR VACCINATION: ICD-10-CM

## 2021-01-27 PROCEDURE — 0011A MODERNA SARS-COV-2 VACCINE: CPT

## 2021-01-27 PROCEDURE — 91301 MODERNA SARS-COV-2 VACCINE: CPT

## 2021-01-28 ENCOUNTER — IMMUNIZATION (OUTPATIENT)
Dept: FAMILY PLANNING/WOMEN'S HEALTH CLINIC | Facility: IMMUNIZATION CENTER | Age: 76
End: 2021-01-28
Payer: MEDICARE

## 2021-01-28 DIAGNOSIS — Z23 ENCOUNTER FOR VACCINATION: Primary | ICD-10-CM

## 2021-02-25 ENCOUNTER — IMMUNIZATION (OUTPATIENT)
Dept: FAMILY PLANNING/WOMEN'S HEALTH CLINIC | Facility: IMMUNIZATION CENTER | Age: 76
End: 2021-02-25
Attending: INTERNAL MEDICINE
Payer: MEDICARE

## 2021-02-25 DIAGNOSIS — Z23 ENCOUNTER FOR VACCINATION: Primary | ICD-10-CM

## 2021-02-25 PROCEDURE — 91301 MODERNA SARS-COV-2 VACCINE: CPT

## 2021-02-25 PROCEDURE — 0012A MODERNA SARS-COV-2 VACCINE: CPT

## 2021-07-12 ENCOUNTER — HOSPITAL ENCOUNTER (OUTPATIENT)
Dept: LAB | Facility: MEDICAL CENTER | Age: 76
End: 2021-07-12
Attending: FAMILY MEDICINE
Payer: MEDICARE

## 2021-07-12 DIAGNOSIS — I10 ESSENTIAL HYPERTENSION: ICD-10-CM

## 2021-07-12 DIAGNOSIS — R35.1 NOCTURIA: ICD-10-CM

## 2021-07-12 DIAGNOSIS — E78.5 DYSLIPIDEMIA: ICD-10-CM

## 2021-07-12 DIAGNOSIS — E03.9 HYPOTHYROIDISM, UNSPECIFIED TYPE: ICD-10-CM

## 2021-07-12 LAB
ALBUMIN SERPL BCP-MCNC: 4.2 G/DL (ref 3.2–4.9)
ALBUMIN/GLOB SERPL: 1.8 G/DL
ALP SERPL-CCNC: 85 U/L (ref 30–99)
ALT SERPL-CCNC: 29 U/L (ref 2–50)
ANION GAP SERPL CALC-SCNC: 8 MMOL/L (ref 7–16)
APPEARANCE UR: CLEAR
AST SERPL-CCNC: 15 U/L (ref 12–45)
BILIRUB SERPL-MCNC: 0.5 MG/DL (ref 0.1–1.5)
BILIRUB UR QL STRIP.AUTO: NEGATIVE
BUN SERPL-MCNC: 15 MG/DL (ref 8–22)
CALCIUM SERPL-MCNC: 8.8 MG/DL (ref 8.5–10.5)
CHLORIDE SERPL-SCNC: 101 MMOL/L (ref 96–112)
CHOLEST SERPL-MCNC: 153 MG/DL (ref 100–199)
CO2 SERPL-SCNC: 25 MMOL/L (ref 20–33)
COLOR UR: YELLOW
CREAT SERPL-MCNC: 0.92 MG/DL (ref 0.5–1.4)
FASTING STATUS PATIENT QL REPORTED: NORMAL
GLOBULIN SER CALC-MCNC: 2.4 G/DL (ref 1.9–3.5)
GLUCOSE SERPL-MCNC: 100 MG/DL (ref 65–99)
GLUCOSE UR STRIP.AUTO-MCNC: NEGATIVE MG/DL
HDLC SERPL-MCNC: 47 MG/DL
KETONES UR STRIP.AUTO-MCNC: NEGATIVE MG/DL
LDLC SERPL CALC-MCNC: 70 MG/DL
LEUKOCYTE ESTERASE UR QL STRIP.AUTO: NEGATIVE
MICRO URNS: NORMAL
NITRITE UR QL STRIP.AUTO: NEGATIVE
PH UR STRIP.AUTO: 6 [PH] (ref 5–8)
POTASSIUM SERPL-SCNC: 4.6 MMOL/L (ref 3.6–5.5)
PROT SERPL-MCNC: 6.6 G/DL (ref 6–8.2)
PROT UR QL STRIP: NEGATIVE MG/DL
PSA SERPL-MCNC: 1.75 NG/ML (ref 0–4)
RBC UR QL AUTO: NEGATIVE
SODIUM SERPL-SCNC: 134 MMOL/L (ref 135–145)
SP GR UR STRIP.AUTO: 1.01
TRIGL SERPL-MCNC: 181 MG/DL (ref 0–149)
TSH SERPL DL<=0.005 MIU/L-ACNC: 3.24 UIU/ML (ref 0.38–5.33)
UROBILINOGEN UR STRIP.AUTO-MCNC: 0.2 MG/DL

## 2021-07-12 PROCEDURE — 84443 ASSAY THYROID STIM HORMONE: CPT

## 2021-07-12 PROCEDURE — 81003 URINALYSIS AUTO W/O SCOPE: CPT

## 2021-07-12 PROCEDURE — 80053 COMPREHEN METABOLIC PANEL: CPT

## 2021-07-12 PROCEDURE — 84153 ASSAY OF PSA TOTAL: CPT

## 2021-07-12 PROCEDURE — 80061 LIPID PANEL: CPT

## 2021-07-12 PROCEDURE — 36415 COLL VENOUS BLD VENIPUNCTURE: CPT

## 2021-07-22 ENCOUNTER — OFFICE VISIT (OUTPATIENT)
Dept: MEDICAL GROUP | Facility: MEDICAL CENTER | Age: 76
End: 2021-07-22
Payer: MEDICARE

## 2021-07-22 VITALS
HEIGHT: 69 IN | TEMPERATURE: 97.9 F | DIASTOLIC BLOOD PRESSURE: 68 MMHG | BODY MASS INDEX: 32.44 KG/M2 | RESPIRATION RATE: 18 BRPM | SYSTOLIC BLOOD PRESSURE: 118 MMHG | WEIGHT: 219 LBS | HEART RATE: 91 BPM | OXYGEN SATURATION: 98 %

## 2021-07-22 DIAGNOSIS — R35.1 NOCTURIA: ICD-10-CM

## 2021-07-22 DIAGNOSIS — R05.9 COUGH: ICD-10-CM

## 2021-07-22 DIAGNOSIS — I10 ESSENTIAL HYPERTENSION: ICD-10-CM

## 2021-07-22 DIAGNOSIS — E03.9 HYPOTHYROIDISM, UNSPECIFIED TYPE: ICD-10-CM

## 2021-07-22 DIAGNOSIS — Z12.11 COLON CANCER SCREENING: ICD-10-CM

## 2021-07-22 DIAGNOSIS — M54.50 LOW BACK PAIN, UNSPECIFIED BACK PAIN LATERALITY, UNSPECIFIED CHRONICITY, UNSPECIFIED WHETHER SCIATICA PRESENT: ICD-10-CM

## 2021-07-22 PROBLEM — M54.9 BACK PAIN: Status: ACTIVE | Noted: 2021-07-22

## 2021-07-22 PROCEDURE — 99214 OFFICE O/P EST MOD 30 MIN: CPT | Performed by: FAMILY MEDICINE

## 2021-07-22 RX ORDER — FINASTERIDE 5 MG/1
5 TABLET, FILM COATED ORAL DAILY
Qty: 90 TABLET | Refills: 4 | Status: SHIPPED | OUTPATIENT
Start: 2021-07-22 | End: 2022-09-26

## 2021-07-22 NOTE — ASSESSMENT & PLAN NOTE
When the patient sits in the wrong chair, he will get upper lumbar back pain which resolves.  This is not associated with bowel or bladder incontinence or retention or perineal anesthesia.  He is currently asymptomatic.

## 2021-07-22 NOTE — PROGRESS NOTES
[facility]  Progress Note  Established Patient    Subjective:   Benedict Marmolejo is a 75 y.o. male here today with a chief complaint of cough..     Hypothyroid  At goal on Synthroid.    Essential hypertension  At goal.    Cough  The patient continues to describe an intermittent cough associated with phlegm.  At the last visit I recommended NeilMed sinus rinse followed by Flonase and Claritin.  He has only been using Flonase occasionally and he is not sure if this is helping.  Flonase can contribute to epistaxis.  I also recommended a chest x-ray but I do not see that this was done.    Nocturia  Patient continues to describe nocturia.  I sent for urinalysis and PSA which were normal.  I started him on Flomax which has helped a little bit.  He does describe to me an episode of urinary retention which self resolved.  He has declined MARLENI.    Back pain  When the patient sits in the wrong chair, he will get upper lumbar back pain which resolves.  This is not associated with bowel or bladder incontinence or retention or perineal anesthesia.  He is currently asymptomatic.      Current Outpatient Medications on File Prior to Visit   Medication Sig Dispense Refill   • VITAMIN D PO Take 2,000 Int'l Units by mouth every 48 hours.     • tamsulosin (FLOMAX) 0.4 MG capsule Take 1 Cap by mouth 1/2 hour after breakfast. 100 Cap 4   • atorvastatin (LIPITOR) 20 MG Tab Take 1 Tab by mouth every day. 100 Tab 4   • levothyroxine (SYNTHROID) 50 MCG Tab Take 1 Tab by mouth every day. 100 Tab 4   • lisinopril (PRINIVIL) 5 MG Tab Take 1 Tab by mouth every day. 100 Tab 4   • ciclopirox (CICLODAN) 8 % solution Apply a thin layer to affected nails once daily. Clean off with alcohol once weekly. 1 Bottle 6   • aspirin EC (ECOTRIN) 81 MG Tablet Delayed Response Take 1 Tab by mouth every day. 90 Tab 4     No current facility-administered medications on file prior to visit.          Objective:     Vitals:    07/22/21 1456   BP:  "118/68   BP Location: Left arm   Patient Position: Sitting   BP Cuff Size: Large adult   Pulse: 91   Resp: 18   Temp: 36.6 °C (97.9 °F)   TempSrc: Temporal   SpO2: 98%   Weight: 99.3 kg (219 lb)   Height: 1.74 m (5' 8.5\")       Physical Exam:  General: alert in no apparent distress.   Cardio: RRR.   Resp: CTAB.   MSK: No tenderness to palpation over the spine.      Assessment and Plan:     1. Nocturia  -Continue Flomax, start finasteride and obtain ultrasound with postvoid residual.  - US-RENAL; Future  - finasteride (PROSCAR) 5 MG Tab; Take 1 tablet by mouth every day.  Dispense: 90 tablet; Refill: 4    2. Cough  -Recommended NeilMed sinus rinse.  - DX-CHEST-2 VIEWS; Future  - PULMONARY FUNCTION TESTS -Test requested: Complete Pulmonary Function Test; Future    3. Essential hypertension  -Continue current regimen.    4. Hypothyroidism, unspecified type  -Continue Synthroid.    5. Colon cancer screening  -Reminded patient to turn in Cologuard.    6. Low back pain, unspecified back pain laterality, unspecified chronicity, unspecified whether sciatica present  -Recommended home exercises, handout given.        Followup: Return in about 2 months (around 9/22/2021), or if symptoms worsen or fail to improve.         "

## 2021-07-22 NOTE — ASSESSMENT & PLAN NOTE
Patient continues to describe nocturia.  I sent for urinalysis and PSA which were normal.  I started him on Flomax which has helped a little bit.  He does describe to me an episode of urinary retention which self resolved.  He has declined MARLENI.

## 2021-07-22 NOTE — ASSESSMENT & PLAN NOTE
The patient continues to describe an intermittent cough associated with phlegm.  At the last visit I recommended NeilMed sinus rinse followed by Flonase and Claritin.  He has only been using Flonase occasionally and he is not sure if this is helping.  Flonase can contribute to epistaxis.  I also recommended a chest x-ray but I do not see that this was done.

## 2021-08-09 DIAGNOSIS — E78.5 DYSLIPIDEMIA: ICD-10-CM

## 2021-08-09 DIAGNOSIS — I10 ESSENTIAL HYPERTENSION: ICD-10-CM

## 2021-08-09 RX ORDER — LISINOPRIL 5 MG/1
TABLET ORAL
Qty: 90 TABLET | Refills: 0 | Status: SHIPPED | OUTPATIENT
Start: 2021-08-09 | End: 2021-10-05

## 2021-08-09 RX ORDER — ATORVASTATIN CALCIUM 20 MG/1
TABLET, FILM COATED ORAL
Qty: 90 TABLET | Refills: 0 | Status: SHIPPED | OUTPATIENT
Start: 2021-08-09 | End: 2021-10-05

## 2022-03-15 NOTE — ASSESSMENT & PLAN NOTE
Patient describes a few months of an intermittently productive and intermittent cough with no associated chest pain or shortness of breath.  Humidifier does not help.  He denies globus sensation.  Of note, the patient does have a past history of transient chest pains but these are not associated with this cough.  He also states that he has tried Flonase and this does seem to help.   No

## 2022-05-03 ENCOUNTER — OFFICE VISIT (OUTPATIENT)
Dept: MEDICAL GROUP | Facility: MEDICAL CENTER | Age: 77
End: 2022-05-03
Payer: MEDICARE

## 2022-05-03 VITALS
OXYGEN SATURATION: 96 % | HEART RATE: 90 BPM | DIASTOLIC BLOOD PRESSURE: 62 MMHG | TEMPERATURE: 98.3 F | HEIGHT: 69 IN | BODY MASS INDEX: 32.88 KG/M2 | WEIGHT: 222 LBS | RESPIRATION RATE: 20 BRPM | SYSTOLIC BLOOD PRESSURE: 132 MMHG

## 2022-05-03 DIAGNOSIS — I49.8 BIGEMINY: ICD-10-CM

## 2022-05-03 DIAGNOSIS — Z13.6 SCREENING FOR ISCHEMIC HEART DISEASE: ICD-10-CM

## 2022-05-03 DIAGNOSIS — R05.9 COUGH: ICD-10-CM

## 2022-05-03 DIAGNOSIS — Z13.1 SCREENING FOR DIABETES MELLITUS: ICD-10-CM

## 2022-05-03 DIAGNOSIS — R35.1 NOCTURIA: ICD-10-CM

## 2022-05-03 DIAGNOSIS — R42 DIZZINESS: ICD-10-CM

## 2022-05-03 DIAGNOSIS — R53.83 OTHER FATIGUE: ICD-10-CM

## 2022-05-03 DIAGNOSIS — M54.50 LOW BACK PAIN, UNSPECIFIED BACK PAIN LATERALITY, UNSPECIFIED CHRONICITY, UNSPECIFIED WHETHER SCIATICA PRESENT: ICD-10-CM

## 2022-05-03 PROBLEM — R07.9 CHEST PAIN: Status: RESOLVED | Noted: 2019-12-09 | Resolved: 2022-05-03

## 2022-05-03 PROBLEM — H61.23 BILATERAL IMPACTED CERUMEN: Status: RESOLVED | Noted: 2020-07-02 | Resolved: 2022-05-03

## 2022-05-03 PROCEDURE — 99214 OFFICE O/P EST MOD 30 MIN: CPT | Performed by: FAMILY MEDICINE

## 2022-05-03 PROCEDURE — 93000 ELECTROCARDIOGRAM COMPLETE: CPT | Performed by: FAMILY MEDICINE

## 2022-05-03 ASSESSMENT — PATIENT HEALTH QUESTIONNAIRE - PHQ9: CLINICAL INTERPRETATION OF PHQ2 SCORE: 0

## 2022-05-03 NOTE — ASSESSMENT & PLAN NOTE
Patient continues to describe nocturia with hesitancy limitedly responsive to Flomax and finasteride.  I have ordered a postvoid residual at the last visit but he did not get this done.  He does describe some significant hesitancy resulting in pain that occurs on occasion.

## 2022-05-03 NOTE — ASSESSMENT & PLAN NOTE
At the last visit the patient described an intermittent cough that has improved with Flonase use.  I sent him for a pulmonary function test and chest x-ray but he did not get these done.

## 2022-05-03 NOTE — PROGRESS NOTES
[facility]  Progress Note  Established Patient    Subjective:   Benedict Marmolejo is a 76 y.o. male here today with a chief complaint of dizziness. The patient is alone.     Cough  At the last visit the patient described an intermittent cough that has improved with Flonase use.  I sent him for a pulmonary function test and chest x-ray but he did not get these done.    Back pain  Patient has longstanding lower back pain.  This seems to be worsening, however.  He describes bilateral generalized lower back pain worse when sitting on a bench or after activity.  He denies bowel or bladder incontinence or retention or perineal anesthesia.  He denies sharp, shooting pain down the leg.  He has not tried anything to help.    Dizziness  Patient reports 3 episodes of dizziness in the past year, the last of which occurred about 3 weeks ago.  This is described as presyncope rather than vertiginous type dizziness.  He denies any associated bowel or bladder incontinence or tongue biting.  He denies associated chest pain, shortness of breath or palpitations.  When the dizzy spells occur they last 15 to 20 seconds.  He does describe some generalized fatigue as well, discussed elsewhere.    Fatigue  Patient describes generalized fatigue.  He does snore.  He denies chest pain or shortness of breath.  He denies palpitations.    Nocturia  Patient continues to describe nocturia with hesitancy limitedly responsive to Flomax and finasteride.  I have ordered a postvoid residual at the last visit but he did not get this done.  He does describe some significant hesitancy resulting in pain that occurs on occasion.      Current Outpatient Medications on File Prior to Visit   Medication Sig Dispense Refill   • tamsulosin (FLOMAX) 0.4 MG capsule TAKE 1 CAPSULE DAILY 1/2 HOUR AFTER BREAKFAST. 90 Capsule 3   • atorvastatin (LIPITOR) 20 MG Tab TAKE 1 TABLET EVERY DAY 90 Tablet 3   • lisinopril (PRINIVIL) 5 MG Tab TAKE 1 TABLET EVERY  "DAY 90 Tablet 3   • levothyroxine (SYNTHROID) 50 MCG Tab TAKE 1 TABLET EVERY  Tablet 4   • finasteride (PROSCAR) 5 MG Tab Take 1 tablet by mouth every day. 90 tablet 4   • VITAMIN D PO Take 2,000 Int'l Units by mouth every 48 hours.     • ciclopirox (CICLODAN) 8 % solution Apply a thin layer to affected nails once daily. Clean off with alcohol once weekly. 1 Bottle 6     No current facility-administered medications on file prior to visit.          Objective:     Vitals:    05/03/22 1439   BP: 132/62   BP Location: Left arm   Patient Position: Sitting   BP Cuff Size: Adult long   Pulse: 90   Resp: 20   Temp: 36.8 °C (98.3 °F)   TempSrc: Temporal   SpO2: 96%   Weight: 101 kg (222 lb)   Height: 1.74 m (5' 8.5\")       Physical Exam:  General: alert in no apparent distress.   Cardio: Occasional additional beats.  No carotid bruit bilaterally.  Resp: CTAB.   Neuro: Cranial nerves II through XII intact, finger-nose normal, gait normal.  Negative Crandall-Hallpike.  Strength 5-5 on bilateral handgrip.  Musculoskeletal: No tenderness palpation of the spine.  Strength 5 out of 5 in the bilateral lower extremities.    EKG: Some premature atrial beats.    Assessment and Plan:     1. Low back pain, unspecified back pain laterality, unspecified chronicity, unspecified whether sciatica present  No red flags.  We will proceed with x-ray and recommended the  [facility].  I will follow-up with the patient in 4 weeks for reevaluation.  - DX-LUMBAR SPINE-2 OR 3 VIEWS; Future    2. Nocturia  Patient will continue his current medication regimen, we will get an updated PSA and I asked him to see urology because I am concerned about retention.  - Referral to Urology  - PROSTATE SPECIFIC AG DIAGNOSTIC; Future    3. Other fatigue  Patient does endorse snoring.  Consider DONTE but will rule out metabolic causes first.  May also be related to arrhythmias, discussed elsewhere.  - CBC WITH DIFFERENTIAL; Future  - TSH WITH " REFLEX TO FT4; Future    4. Screening for ischemic heart disease  - Lipid Profile; Future    5. Screening for diabetes mellitus  - Comp Metabolic Panel; Future    6. Cough  This has improved.  I did asked the patient to get an x-ray.  He will continue Flonase.  - DX-CHEST-2 VIEWS; Future    7. Dizziness  Considering the premature atrial beats, I would be concerned about a paroxysmal arrhythmia.  I asked patient to get a Zio patch and follow-up with cardiology.  The dizziness does not sound neurogenic.  I will get labs, however.  - REFERRAL TO CARDIOLOGY  - [facility] ZIO PATCH MONITOR; Future    8. Bigeminy  Atrial bigeminy identified on one of the EKGs.  - REFERRAL TO CARDIOLOGY  - [facility] ZIO PATCH MONITOR; Future        Followup: Return in about 4 weeks (around 5/31/2022), or if symptoms worsen or fail to improve.

## 2022-05-03 NOTE — ASSESSMENT & PLAN NOTE
Patient describes generalized fatigue.  He does snore.  He denies chest pain or shortness of breath.  He denies palpitations.

## 2022-05-03 NOTE — ASSESSMENT & PLAN NOTE
Patient has longstanding lower back pain.  This seems to be worsening, however.  He describes bilateral generalized lower back pain worse when sitting on a bench or after activity.  He denies bowel or bladder incontinence or retention or perineal anesthesia.  He denies sharp, shooting pain down the leg.  He has not tried anything to help.

## 2022-05-03 NOTE — ASSESSMENT & PLAN NOTE
Patient reports 3 episodes of dizziness in the past year, the last of which occurred about 3 weeks ago.  This is described as presyncope rather than vertiginous type dizziness.  He denies any associated bowel or bladder incontinence or tongue biting.  He denies associated chest pain, shortness of breath or palpitations.  When the dizzy spells occur they last 15 to 20 seconds.  He does describe some generalized fatigue as well, discussed elsewhere.

## 2022-05-24 ENCOUNTER — NON-PROVIDER VISIT (OUTPATIENT)
Dept: CARDIOLOGY | Facility: MEDICAL CENTER | Age: 77
End: 2022-05-24
Attending: FAMILY MEDICINE
Payer: MEDICARE

## 2022-05-24 DIAGNOSIS — I49.8 BIGEMINY: ICD-10-CM

## 2022-05-24 DIAGNOSIS — I49.3 PVC'S (PREMATURE VENTRICULAR CONTRACTIONS): ICD-10-CM

## 2022-05-24 DIAGNOSIS — I47.10 SVT (SUPRAVENTRICULAR TACHYCARDIA) (HCC): ICD-10-CM

## 2022-05-24 DIAGNOSIS — I49.8 VENTRICULAR BIGEMINY: ICD-10-CM

## 2022-05-24 DIAGNOSIS — R42 DIZZINESS: ICD-10-CM

## 2022-05-24 DIAGNOSIS — I49.8 VENTRICULAR TRIGEMINY: ICD-10-CM

## 2022-05-24 NOTE — PROGRESS NOTES
Patient enrolled in the 14 day Zio XT Holter monitoring program, per Myles Strong M.D.  >In clinic hook up, monitor S/N [id number].  >Pending EOS.

## 2022-05-26 ENCOUNTER — HOSPITAL ENCOUNTER (OUTPATIENT)
Dept: LAB | Facility: MEDICAL CENTER | Age: 77
End: 2022-05-26
Attending: FAMILY MEDICINE
Payer: MEDICARE

## 2022-05-26 DIAGNOSIS — R35.1 NOCTURIA: ICD-10-CM

## 2022-05-26 DIAGNOSIS — Z13.1 SCREENING FOR DIABETES MELLITUS: ICD-10-CM

## 2022-05-26 DIAGNOSIS — R53.83 OTHER FATIGUE: ICD-10-CM

## 2022-05-26 DIAGNOSIS — Z13.6 SCREENING FOR ISCHEMIC HEART DISEASE: ICD-10-CM

## 2022-05-26 LAB
ALBUMIN SERPL BCP-MCNC: 4.2 G/DL (ref 3.2–4.9)
ALBUMIN/GLOB SERPL: 1.6 G/DL
ALP SERPL-CCNC: 89 U/L (ref 30–99)
ALT SERPL-CCNC: 33 U/L (ref 2–50)
ANION GAP SERPL CALC-SCNC: 11 MMOL/L (ref 7–16)
AST SERPL-CCNC: 21 U/L (ref 12–45)
BASOPHILS # BLD AUTO: 0.5 % (ref 0–1.8)
BASOPHILS # BLD: 0.04 K/UL (ref 0–0.12)
BILIRUB SERPL-MCNC: 0.6 MG/DL (ref 0.1–1.5)
BUN SERPL-MCNC: 18 MG/DL (ref 8–22)
CALCIUM SERPL-MCNC: 9.2 MG/DL (ref 8.5–10.5)
CHLORIDE SERPL-SCNC: 101 MMOL/L (ref 96–112)
CHOLEST SERPL-MCNC: 171 MG/DL (ref 100–199)
CO2 SERPL-SCNC: 24 MMOL/L (ref 20–33)
CREAT SERPL-MCNC: 0.94 MG/DL (ref 0.5–1.4)
EOSINOPHIL # BLD AUTO: 0.1 K/UL (ref 0–0.51)
EOSINOPHIL NFR BLD: 1.4 % (ref 0–6.9)
ERYTHROCYTE [DISTWIDTH] IN BLOOD BY AUTOMATED COUNT: 46.2 FL (ref 35.9–50)
FASTING STATUS PATIENT QL REPORTED: NORMAL
GFR SERPLBLD CREATININE-BSD FMLA CKD-EPI: 85 ML/MIN/1.73 M 2
GLOBULIN SER CALC-MCNC: 2.6 G/DL (ref 1.9–3.5)
GLUCOSE SERPL-MCNC: 98 MG/DL (ref 65–99)
HCT VFR BLD AUTO: 51.3 % (ref 42–52)
HDLC SERPL-MCNC: 43 MG/DL
HGB BLD-MCNC: 17.4 G/DL (ref 14–18)
IMM GRANULOCYTES # BLD AUTO: 0.03 K/UL (ref 0–0.11)
IMM GRANULOCYTES NFR BLD AUTO: 0.4 % (ref 0–0.9)
LDLC SERPL CALC-MCNC: 86 MG/DL
LYMPHOCYTES # BLD AUTO: 2.46 K/UL (ref 1–4.8)
LYMPHOCYTES NFR BLD: 33.6 % (ref 22–41)
MCH RBC QN AUTO: 33.8 PG (ref 27–33)
MCHC RBC AUTO-ENTMCNC: 33.9 G/DL (ref 33.7–35.3)
MCV RBC AUTO: 99.6 FL (ref 81.4–97.8)
MONOCYTES # BLD AUTO: 0.58 K/UL (ref 0–0.85)
MONOCYTES NFR BLD AUTO: 7.9 % (ref 0–13.4)
NEUTROPHILS # BLD AUTO: 4.11 K/UL (ref 1.82–7.42)
NEUTROPHILS NFR BLD: 56.2 % (ref 44–72)
NRBC # BLD AUTO: 0 K/UL
NRBC BLD-RTO: 0 /100 WBC
PLATELET # BLD AUTO: 249 K/UL (ref 164–446)
PMV BLD AUTO: 8.7 FL (ref 9–12.9)
POTASSIUM SERPL-SCNC: 5.3 MMOL/L (ref 3.6–5.5)
PROT SERPL-MCNC: 6.8 G/DL (ref 6–8.2)
PSA SERPL-MCNC: 1.32 NG/ML (ref 0–4)
RBC # BLD AUTO: 5.15 M/UL (ref 4.7–6.1)
SODIUM SERPL-SCNC: 136 MMOL/L (ref 135–145)
TRIGL SERPL-MCNC: 210 MG/DL (ref 0–149)
TSH SERPL DL<=0.005 MIU/L-ACNC: 3.42 UIU/ML (ref 0.38–5.33)
WBC # BLD AUTO: 7.3 K/UL (ref 4.8–10.8)

## 2022-05-26 PROCEDURE — 36415 COLL VENOUS BLD VENIPUNCTURE: CPT

## 2022-05-26 PROCEDURE — 85025 COMPLETE CBC W/AUTO DIFF WBC: CPT

## 2022-05-26 PROCEDURE — 80061 LIPID PANEL: CPT

## 2022-05-26 PROCEDURE — 84153 ASSAY OF PSA TOTAL: CPT

## 2022-05-26 PROCEDURE — 84443 ASSAY THYROID STIM HORMONE: CPT

## 2022-05-26 PROCEDURE — 80053 COMPREHEN METABOLIC PANEL: CPT

## 2022-06-06 ENCOUNTER — TELEPHONE (OUTPATIENT)
Dept: CARDIOLOGY | Facility: MEDICAL CENTER | Age: 77
End: 2022-06-06
Payer: MEDICARE

## 2022-06-06 PROCEDURE — 93248 EXT ECG>7D<15D REV&INTERPJ: CPT | Performed by: INTERNAL MEDICINE

## 2022-06-06 PROCEDURE — 93246 EXT ECG>7D<15D RECORDING: CPT | Performed by: INTERNAL MEDICINE

## 2022-06-10 ENCOUNTER — HOSPITAL ENCOUNTER (OUTPATIENT)
Dept: RADIOLOGY | Facility: MEDICAL CENTER | Age: 77
End: 2022-06-10
Attending: FAMILY MEDICINE
Payer: MEDICARE

## 2022-06-10 DIAGNOSIS — R05.9 COUGH: ICD-10-CM

## 2022-06-10 DIAGNOSIS — M54.50 LOW BACK PAIN, UNSPECIFIED BACK PAIN LATERALITY, UNSPECIFIED CHRONICITY, UNSPECIFIED WHETHER SCIATICA PRESENT: ICD-10-CM

## 2022-06-10 PROCEDURE — 71046 X-RAY EXAM CHEST 2 VIEWS: CPT

## 2022-06-10 PROCEDURE — 72100 X-RAY EXAM L-S SPINE 2/3 VWS: CPT

## 2022-06-15 ENCOUNTER — TELEPHONE (OUTPATIENT)
Dept: MEDICAL GROUP | Facility: MEDICAL CENTER | Age: 77
End: 2022-06-15

## 2022-06-15 ENCOUNTER — OFFICE VISIT (OUTPATIENT)
Dept: MEDICAL GROUP | Facility: MEDICAL CENTER | Age: 77
End: 2022-06-15
Payer: MEDICARE

## 2022-06-15 VITALS
OXYGEN SATURATION: 97 % | HEIGHT: 69 IN | WEIGHT: 222.55 LBS | DIASTOLIC BLOOD PRESSURE: 68 MMHG | BODY MASS INDEX: 32.96 KG/M2 | RESPIRATION RATE: 20 BRPM | SYSTOLIC BLOOD PRESSURE: 120 MMHG | TEMPERATURE: 97.4 F | HEART RATE: 94 BPM

## 2022-06-15 DIAGNOSIS — M54.50 LOW BACK PAIN, UNSPECIFIED BACK PAIN LATERALITY, UNSPECIFIED CHRONICITY, UNSPECIFIED WHETHER SCIATICA PRESENT: ICD-10-CM

## 2022-06-15 DIAGNOSIS — R42 DIZZINESS: ICD-10-CM

## 2022-06-15 DIAGNOSIS — R53.83 OTHER FATIGUE: ICD-10-CM

## 2022-06-15 DIAGNOSIS — R05.9 COUGH: ICD-10-CM

## 2022-06-15 DIAGNOSIS — B35.1 ONYCHOMYCOSIS: ICD-10-CM

## 2022-06-15 DIAGNOSIS — R35.1 NOCTURIA: ICD-10-CM

## 2022-06-15 PROBLEM — M54.9 BACK PAIN: Status: RESOLVED | Noted: 2021-07-22 | Resolved: 2022-06-15

## 2022-06-15 PROCEDURE — 99214 OFFICE O/P EST MOD 30 MIN: CPT | Performed by: FAMILY MEDICINE

## 2022-06-15 RX ORDER — EFINACONAZOLE 100 MG/ML
SOLUTION TOPICAL
Qty: 8 ML | Refills: 1 | Status: SHIPPED | OUTPATIENT
Start: 2022-06-15 | End: 2023-09-19

## 2022-06-15 ASSESSMENT — FIBROSIS 4 INDEX: FIB4 SCORE: 1.09

## 2022-06-15 NOTE — TELEPHONE ENCOUNTER
Patient scheduled to see cardiology in July. He wanted to be seen sooner. Can we call cardiology and see if they can see him sooner.

## 2022-06-15 NOTE — TELEPHONE ENCOUNTER
Phone Number Called: [phone] (home)     Call outcome: Spoke with cardiology scheduling and they added pt to the waiting list. If an derrek becomes available sooner thant his scheduled derrek they'll contact pt. I also called pt and informed.   Dr. Strong, please note...

## 2022-06-16 NOTE — ASSESSMENT & PLAN NOTE
At the last visit the patient reported 3 episodes of dizziness within the past year described as a presyncope rather than vertiginous type dizziness.  Labs did not demonstrate a clear etiology but his Zio patch did demonstrate SVT.  He is scheduled to see cardiology in about a month.  He has not had any recurrence of the dizziness since our last visit.

## 2022-06-16 NOTE — ASSESSMENT & PLAN NOTE
The patient had described to me an intermittent cough improved with Flonase.  Chest x-ray was reassuring.  He never did get pulmonary function testing which was also previously ordered.  Patient's cough improved, however, about 2 weeks ago it started up again.

## 2022-06-16 NOTE — PROGRESS NOTES
[facility]  Progress Note  Established Patient    Subjective:   Benedict Marmolejo is a 76 y.o. male here today with a chief complaint of cough. The patient is alone.     Onychomycosis  Patient has onychomycosis nonresponsive to over-the-counter agents and Penlac.    Fatigue  At the last visit the patient described generalized fatigue in the setting of snoring.  He denied chest pain, shortness of breath and palpitations.  CBC and TSH did not demonstrate a clear etiology to the fatigue.    Dizziness  At the last visit the patient reported 3 episodes of dizziness within the past year described as a presyncope rather than vertiginous type dizziness.  Labs did not demonstrate a clear etiology but his Zio patch did demonstrate SVT.  He is scheduled to see cardiology in about a month.  He has not had any recurrence of the dizziness since our last visit.    Back pain  This has resolved.  X-ray of the lumbar spine demonstrated degeneration.    Nocturia  Patient was referred to urology but he has not yet made this appointment.  He is already on Flomax and finasteride.  PSA is normal.    Cough  The patient had described to me an intermittent cough improved with Flonase.  Chest x-ray was reassuring.  He never did get pulmonary function testing which was also previously ordered.  Patient's cough improved, however, about 2 weeks ago it started up again.      Current Outpatient Medications on File Prior to Visit   Medication Sig Dispense Refill   • tamsulosin (FLOMAX) 0.4 MG capsule TAKE 1 CAPSULE DAILY 1/2 HOUR AFTER BREAKFAST. 90 Capsule 3   • atorvastatin (LIPITOR) 20 MG Tab TAKE 1 TABLET EVERY DAY 90 Tablet 3   • lisinopril (PRINIVIL) 5 MG Tab TAKE 1 TABLET EVERY DAY 90 Tablet 3   • levothyroxine (SYNTHROID) 50 MCG Tab TAKE 1 TABLET EVERY  Tablet 4   • finasteride (PROSCAR) 5 MG Tab Take 1 tablet by mouth every day. 90 tablet 4   • VITAMIN D PO Take 2,000 Int'l Units by mouth every 48 hours.       No  "current facility-administered medications on file prior to visit.          Objective:     Vitals:    06/15/22 1429   BP: 120/68   BP Location: Left arm   Patient Position: Sitting   BP Cuff Size: Adult long   Pulse: 94   Resp: 20   Temp: 36.3 °C (97.4 °F)   TempSrc: Temporal   SpO2: 97%   Weight: 101 kg (222 lb 8.9 oz)   Height: 1.74 m (5' 8.5\")       Physical Exam:  General: alert in no apparent distress.   Cardio: RRR.   Resp: CTAB.   ENMT: Pharyngeal cobblestoning noted.  Skin: Toenail onychomycosis.      Assessment and Plan:     1. Cough  I suggested pulmonary function testing but the patient would like to defer.  I suspect that he may have COPD.  I prescribed him Breo and asked him to use it for 2 weeks.  If his symptoms worsen I asked him to reach out.  If his symptoms do not resolve within 3 weeks, I asked him to let me know.   - Fluticasone Furoate-Vilanterol (BREO ELLIPTA) 200-25 MCG/INH AEROSOL POWDER, BREATH ACTIVATED; Inhale 1 Puff every day for 14 days.  Dispense: 1 Each; Refill: 0    2. Onychomycosis  Failed treatment with Penlac.  - Efinaconazole (JUBLIA) 10 % Solution; Apply to toenails nightly x 48 weeks.  Dispense: 8 mL; Refill: 1    3. Other fatigue  I suspect that the patient has obstructive sleep apnea, which may also be contributing to his SVT.  I suggested a sleep medicine consultation but the patient would like to hold off and see what cardiology has to say regarding the SVT, which may also be playing a role independently in terms of the fatigue.    4. Dizziness  The patient will be seeing cardiology in about a month.  I suspect these dizzy spells are a product of SVT.  I suggested treating him with a beta-blocker to suppress any future SVT episodes but the patient would like to consult with cardiology first.  We will try to move up his appointment.    5. Low back pain, unspecified back pain laterality, unspecified chronicity, unspecified whether sciatica present  Resolved.  If it recurs, I " asked him to let me know.  At that point we would consider MRI.    6. Nocturia  Reminded patient to schedule with urology.  Number provided.    I informed the patient that I will not be returning to work with [employer] as of August 01, 2022. I informed the patient that he should establish with a new doctor before then. It has been a privilege serving as this patient's family doctor and I wish him the best.  I informed the patient that if he needs to see me before I leave I'm available for appointments.      Followup: Return if symptoms worsen or fail to improve.

## 2022-06-16 NOTE — ASSESSMENT & PLAN NOTE
Patient was referred to urology but he has not yet made this appointment.  He is already on Flomax and finasteride.  PSA is normal.

## 2022-06-16 NOTE — ASSESSMENT & PLAN NOTE
At the last visit the patient described generalized fatigue in the setting of snoring.  He denied chest pain, shortness of breath and palpitations.  CBC and TSH did not demonstrate a clear etiology to the fatigue.

## 2022-07-07 ENCOUNTER — TELEPHONE (OUTPATIENT)
Dept: CARDIOLOGY | Facility: MEDICAL CENTER | Age: 77
End: 2022-07-07
Payer: MEDICARE

## 2022-07-07 NOTE — TELEPHONE ENCOUNTER
Spoke to patient in regards to records for NP appointment with Dr. Clifton. Patient has never been treated by a cardiologist, all recent records in epic including blood work, cardiac testing, and EKG. Confirmed appt date, time and location.

## 2022-07-27 ENCOUNTER — TELEPHONE (OUTPATIENT)
Dept: MEDICAL GROUP | Facility: LAB | Age: 77
End: 2022-07-27
Payer: MEDICARE

## 2022-07-27 ENCOUNTER — OFFICE VISIT (OUTPATIENT)
Dept: CARDIOLOGY | Facility: MEDICAL CENTER | Age: 77
End: 2022-07-27
Attending: FAMILY MEDICINE
Payer: MEDICARE

## 2022-07-27 VITALS
HEART RATE: 95 BPM | BODY MASS INDEX: 32.73 KG/M2 | WEIGHT: 221 LBS | HEIGHT: 69 IN | RESPIRATION RATE: 12 BRPM | DIASTOLIC BLOOD PRESSURE: 68 MMHG | OXYGEN SATURATION: 95 % | SYSTOLIC BLOOD PRESSURE: 112 MMHG

## 2022-07-27 DIAGNOSIS — R42 DIZZINESS: ICD-10-CM

## 2022-07-27 DIAGNOSIS — R06.09 DYSPNEA ON EXERTION: ICD-10-CM

## 2022-07-27 DIAGNOSIS — I10 ESSENTIAL HYPERTENSION: ICD-10-CM

## 2022-07-27 DIAGNOSIS — E78.5 DYSLIPIDEMIA: ICD-10-CM

## 2022-07-27 LAB — EKG IMPRESSION: NORMAL

## 2022-07-27 PROCEDURE — 99204 OFFICE O/P NEW MOD 45 MIN: CPT | Performed by: INTERNAL MEDICINE

## 2022-07-27 PROCEDURE — 93000 ELECTROCARDIOGRAM COMPLETE: CPT | Performed by: INTERNAL MEDICINE

## 2022-07-27 ASSESSMENT — ENCOUNTER SYMPTOMS
NEAR-SYNCOPE: 0
FLANK PAIN: 0
PALPITATIONS: 0
PND: 0
ORTHOPNEA: 0
CONSTIPATION: 0
ABDOMINAL PAIN: 0
WEIGHT GAIN: 0
VOMITING: 0
DYSPNEA ON EXERTION: 0
BLURRED VISION: 0
COUGH: 0
SHORTNESS OF BREATH: 0
IRREGULAR HEARTBEAT: 0
HEARTBURN: 0
DECREASED APPETITE: 0
ALTERED MENTAL STATUS: 0
CLAUDICATION: 0
NAUSEA: 0
FEVER: 0
DIARRHEA: 0
WEIGHT LOSS: 0
BACK PAIN: 0
DIZZINESS: 0
DEPRESSION: 0
SYNCOPE: 0

## 2022-07-27 ASSESSMENT — FIBROSIS 4 INDEX: FIB4 SCORE: 1.09

## 2022-07-27 NOTE — PROGRESS NOTES
Cardiology Note    Chief Complaint   Patient presents with   • Dizziness       History of Present Illness: Benedict Marmolejo is a 76 y.o. male who presents for initial visit. Referred by Dr Strong for dizziness.     This visit patient describes one dizzy spell while driving. No recurrence out side of this. Did not lose consciousness. Sensation lasted about 20-30 seconds. Not very active however does walk and not symptomatic during these times. Smoked pipe about 10 years. About three glasses wine per night. Not active. Mostly watches televisions and photographs mountains from his home. Has noted to dypsnea with exertion. No other toxic social habits. Adopted and family history unknown.    Review of Systems   Constitutional: Negative for decreased appetite, fever, malaise/fatigue, weight gain and weight loss.   HENT: Negative for congestion and nosebleeds.    Eyes: Negative for blurred vision.   Cardiovascular: Negative for chest pain, claudication, dyspnea on exertion, irregular heartbeat, leg swelling, near-syncope, orthopnea, palpitations, paroxysmal nocturnal dyspnea and syncope.   Respiratory: Negative for cough and shortness of breath.    Endocrine: Negative for cold intolerance and heat intolerance.   Skin: Negative for rash.   Musculoskeletal: Negative for back pain.   Gastrointestinal: Negative for abdominal pain, constipation, diarrhea, heartburn, melena, nausea and vomiting.   Genitourinary: Negative for dysuria, flank pain and hematuria.   Neurological: Negative for dizziness.   Psychiatric/Behavioral: Negative for altered mental status and depression.         Past Medical History:   Diagnosis Date   • Diabetes (HCC)    • Hyperlipidemia    • Hypertension    • Thyroid disease          Past Surgical History:   Procedure Laterality Date   • EYE SURGERY           Current Outpatient Medications   Medication Sig Dispense Refill   • tamsulosin (FLOMAX) 0.4 MG capsule TAKE 1 CAPSULE DAILY 1/2 HOUR AFTER  "BREAKFAST. 90 Capsule 3   • atorvastatin (LIPITOR) 20 MG Tab TAKE 1 TABLET EVERY DAY 90 Tablet 3   • lisinopril (PRINIVIL) 5 MG Tab TAKE 1 TABLET EVERY DAY 90 Tablet 3   • levothyroxine (SYNTHROID) 50 MCG Tab TAKE 1 TABLET EVERY  Tablet 4   • finasteride (PROSCAR) 5 MG Tab Take 1 tablet by mouth every day. 90 tablet 4   • VITAMIN D PO Take 2,000 Int'l Units by mouth every 48 hours.     • Efinaconazole (JUBLIA) 10 % Solution Apply to toenails nightly x 48 weeks. (Patient not taking: Reported on 2022) 8 mL 1     No current facility-administered medications for this visit.         No Known Allergies      Family History   Family history unknown: Yes         Social History     Socioeconomic History   • Marital status:      Spouse name: Not on file   • Number of children: Not on file   • Years of education: Not on file   • Highest education level: Not on file   Occupational History   • Not on file   Tobacco Use   • Smoking status: Former Smoker     Quit date:      Years since quittin.5   • Smokeless tobacco: Never Used   Vaping Use   • Vaping Use: Never used   Substance and Sexual Activity   • Alcohol use: Yes     Alcohol/week: 1.2 - 1.8 oz     Types: 2 - 3 Glasses of wine per week   • Drug use: No   • Sexual activity: Never   Other Topics Concern   • Not on file   Social History Narrative   • Not on file     Social Determinants of Health     Financial Resource Strain: Not on file   Food Insecurity: Not on file   Transportation Needs: Not on file   Physical Activity: Not on file   Stress: Not on file   Social Connections: Not on file   Intimate Partner Violence: Not on file   Housing Stability: Not on file         Physical Exam:  Ambulatory Vitals  /68 (BP Location: Left arm, Patient Position: Sitting, BP Cuff Size: Adult)   Pulse 95   Resp 12   Ht 1.74 m (5' 8.5\")   Wt 100 kg (221 lb)   SpO2 95%    BP Readings from Last 4 Encounters:   22 112/68   06/15/22 120/68   22 " "132/62   07/22/21 118/68     Weight/BMI:   Vitals:    07/27/22 1520   BP: 112/68   Weight: 100 kg (221 lb)   Height: 1.74 m (5' 8.5\")    Body mass index is 33.11 kg/m².  Wt Readings from Last 4 Encounters:   07/27/22 100 kg (221 lb)   06/15/22 101 kg (222 lb 8.9 oz)   05/03/22 101 kg (222 lb)   07/22/21 99.3 kg (219 lb)       Physical Exam  Constitutional:       General: He is not in acute distress.  HENT:      Head: Normocephalic and atraumatic.   Eyes:      Conjunctiva/sclera: Conjunctivae normal.      Pupils: Pupils are equal, round, and reactive to light.   Neck:      Vascular: No JVD.   Cardiovascular:      Rate and Rhythm: Normal rate and regular rhythm.      Heart sounds: Normal heart sounds. No murmur heard.    No friction rub. No gallop.   Pulmonary:      Effort: Pulmonary effort is normal. No respiratory distress.      Breath sounds: Normal breath sounds. No wheezing or rales.   Chest:      Chest wall: No tenderness.   Abdominal:      General: Bowel sounds are normal. There is no distension.      Palpations: Abdomen is soft.   Musculoskeletal:      Cervical back: Normal range of motion and neck supple.   Skin:     General: Skin is warm and dry.   Neurological:      Mental Status: He is alert and oriented to person, place, and time.   Psychiatric:         Mood and Affect: Affect normal.         Judgment: Judgment normal.         Lab Data Review:  Lab Results   Component Value Date/Time    CHOLSTRLTOT 171 05/26/2022 12:49 PM    LDL 86 05/26/2022 12:49 PM    HDL 43 05/26/2022 12:49 PM    TRIGLYCERIDE 210 (H) 05/26/2022 12:49 PM       Lab Results   Component Value Date/Time    SODIUM 136 05/26/2022 12:49 PM    POTASSIUM 5.3 05/26/2022 12:49 PM    CHLORIDE 101 05/26/2022 12:49 PM    CO2 24 05/26/2022 12:49 PM    GLUCOSE 98 05/26/2022 12:49 PM    BUN 18 05/26/2022 12:49 PM    CREATININE 0.94 05/26/2022 12:49 PM     CrCl cannot be calculated (Patient's most recent lab result is older than the maximum 7 days " allowed.).  Lab Results   Component Value Date/Time    ALKPHOSPHAT 89 05/26/2022 12:49 PM    ASTSGOT 21 05/26/2022 12:49 PM    ALTSGPT 33 05/26/2022 12:49 PM    TBILIRUBIN 0.6 05/26/2022 12:49 PM      Lab Results   Component Value Date/Time    WBC 7.3 05/26/2022 12:49 PM     Lab Results   Component Value Date/Time    HBA1C 5.8 (H) 06/17/2020 10:55 AM     No components found for: TROP      Cardiac Imaging and Procedures Review:      EKG 7/27/22 interpreted by me sinus, LAFB, anteroseptal q waves    ZIO PATCH REPORT (05/24/22-05/31/22)   Zio Patch study showing predominately sinus rhythm with maximum rate of 261 bpm, minimum rate of 54 bpm, average of 86 bpm.   Atrial fibrillation: None.   Supraventricular tachycardia: 232 episodes of supraventricular tachycardia with fastest interval lasting 8 beats with a max rate of 261 bpm with longest lasting 24.5 seconds with average rate of 122 bpm noted.   Pauses: None.   Heart block: None.   Ventricular tachycardia: None.   <1% burden of premature ventricular contractions and <1% burden of premature atrial contractions.   Ventricular bigeminy and trigeminy were noted.     Medical Decision Making:  Problem List Items Addressed This Visit     Dyslipidemia    Essential hypertension    Relevant Orders    EKG (Completed)    Dizziness        Check cardiac function with TTE and rule out coronary disease with CCTA given abnormal ekg and dyspnea with exertion. SVT not likely causative of symptom however if should develop palpitations can consider bb or ccb.     It was my pleasure to meet with Mr. Marmolejo.

## 2022-07-27 NOTE — TELEPHONE ENCOUNTER
----- Message from Jose Elias Shepard M.D. sent at 7/26/2022  5:13 PM PDT -----  Next available.  ----- Message -----  From: Martha Bashir, Med Ass't  Sent: 7/26/2022   4:47 PM PDT  To: Jose Elias Shepard M.D.    Override limits or schedule in next available?   ----- Message -----  From: Cookie Gonsales, Med Ass't  Sent: 7/26/2022   4:07 PM PDT  To: Martha Bashir Med Ass't    Pt LVM requesting a NP appt with DR. Shepard since his PCP is leaving. Can you call to schedule

## 2022-09-07 ENCOUNTER — TELEPHONE (OUTPATIENT)
Dept: MEDICAL GROUP | Facility: LAB | Age: 77
End: 2022-09-07
Payer: MEDICARE

## 2022-09-07 RX ORDER — FLUTICASONE PROPIONATE 50 MCG
1 SPRAY, SUSPENSION (ML) NASAL DAILY
COMMUNITY
End: 2023-10-31 | Stop reason: SDUPTHER

## 2022-09-07 NOTE — TELEPHONE ENCOUNTER
NEW PATIENT VISIT PRE-VISIT PLANNING    1.  [employer] Patient is checked in Patient Demographics?Yes    2.  Immunizations were updated in Epic using Reconcile Outside Information activity? Yes         3.  Is this appointment scheduled as a Hospital Follow-Up? No    4.  Patient is due for the following Health Maintenance Topics:   Health Maintenance Due   Topic Date Due    Annual Wellness Visit  Never done    IMM HEP B VACCINE (1 of 3 - 3-dose series) Never done    IMM DTaP/Tdap/Td Vaccine (1 - Tdap) Never done    COLORECTAL CANCER SCREENING  Never done    IMM ZOSTER VACCINES (1 of 2) Never done    ABDOMINAL AORTIC ANEURYSM (AAA) SCREEN  Never done    COVID-19 Vaccine (4 - Booster for Moderna series) 03/10/2022    IMM INFLUENZA (1) 09/01/2022     5.  Reviewed/Updated the following with patient:          Preferred Pharmacy? Yes          Preferred Lab? Yes          Preferred Communication? Yes          Allergies? Yes          Medications? YES. Was Abstract Encounter opened and chart updated? YES    6.  Updated Care Team?          DME Company (gait device, O2, CPAP, etc.) N\A          Other Specialists (eye doctor, derm, GYN, cardiology, endo, etc): YES    7.  AHA (Puls8) form printed for Provider? N/A

## 2022-09-11 SDOH — ECONOMIC STABILITY: INCOME INSECURITY: HOW HARD IS IT FOR YOU TO PAY FOR THE VERY BASICS LIKE FOOD, HOUSING, MEDICAL CARE, AND HEATING?: NOT VERY HARD

## 2022-09-11 SDOH — ECONOMIC STABILITY: HOUSING INSECURITY: IN THE LAST 12 MONTHS, HOW MANY PLACES HAVE YOU LIVED?: 1

## 2022-09-11 SDOH — ECONOMIC STABILITY: TRANSPORTATION INSECURITY
IN THE PAST 12 MONTHS, HAS LACK OF TRANSPORTATION KEPT YOU FROM MEETINGS, WORK, OR FROM GETTING THINGS NEEDED FOR DAILY LIVING?: NO

## 2022-09-11 SDOH — ECONOMIC STABILITY: INCOME INSECURITY: IN THE LAST 12 MONTHS, WAS THERE A TIME WHEN YOU WERE NOT ABLE TO PAY THE MORTGAGE OR RENT ON TIME?: NO

## 2022-09-11 SDOH — HEALTH STABILITY: PHYSICAL HEALTH: ON AVERAGE, HOW MANY DAYS PER WEEK DO YOU ENGAGE IN MODERATE TO STRENUOUS EXERCISE (LIKE A BRISK WALK)?: 0 DAYS

## 2022-09-11 SDOH — ECONOMIC STABILITY: TRANSPORTATION INSECURITY
IN THE PAST 12 MONTHS, HAS LACK OF RELIABLE TRANSPORTATION KEPT YOU FROM MEDICAL APPOINTMENTS, MEETINGS, WORK OR FROM GETTING THINGS NEEDED FOR DAILY LIVING?: NO

## 2022-09-11 SDOH — ECONOMIC STABILITY: TRANSPORTATION INSECURITY
IN THE PAST 12 MONTHS, HAS THE LACK OF TRANSPORTATION KEPT YOU FROM MEDICAL APPOINTMENTS OR FROM GETTING MEDICATIONS?: NO

## 2022-09-11 SDOH — ECONOMIC STABILITY: HOUSING INSECURITY
IN THE LAST 12 MONTHS, WAS THERE A TIME WHEN YOU DID NOT HAVE A STEADY PLACE TO SLEEP OR SLEPT IN A SHELTER (INCLUDING NOW)?: NO

## 2022-09-11 SDOH — HEALTH STABILITY: PHYSICAL HEALTH: ON AVERAGE, HOW MANY MINUTES DO YOU ENGAGE IN EXERCISE AT THIS LEVEL?: 0 MIN

## 2022-09-11 SDOH — ECONOMIC STABILITY: FOOD INSECURITY: WITHIN THE PAST 12 MONTHS, THE FOOD YOU BOUGHT JUST DIDN'T LAST AND YOU DIDN'T HAVE MONEY TO GET MORE.: NEVER TRUE

## 2022-09-11 SDOH — ECONOMIC STABILITY: FOOD INSECURITY: WITHIN THE PAST 12 MONTHS, YOU WORRIED THAT YOUR FOOD WOULD RUN OUT BEFORE YOU GOT MONEY TO BUY MORE.: NEVER TRUE

## 2022-09-11 SDOH — HEALTH STABILITY: MENTAL HEALTH
STRESS IS WHEN SOMEONE FEELS TENSE, NERVOUS, ANXIOUS, OR CAN'T SLEEP AT NIGHT BECAUSE THEIR MIND IS TROUBLED. HOW STRESSED ARE YOU?: ONLY A LITTLE

## 2022-09-11 ASSESSMENT — LIFESTYLE VARIABLES
SKIP TO QUESTIONS 9-10: 0
HOW OFTEN DO YOU HAVE A DRINK CONTAINING ALCOHOL: 4 OR MORE TIMES A WEEK
HOW MANY STANDARD DRINKS CONTAINING ALCOHOL DO YOU HAVE ON A TYPICAL DAY: 3 OR 4
AUDIT-C TOTAL SCORE: 5
HOW OFTEN DO YOU HAVE SIX OR MORE DRINKS ON ONE OCCASION: NEVER

## 2022-09-11 ASSESSMENT — SOCIAL DETERMINANTS OF HEALTH (SDOH)
HOW HARD IS IT FOR YOU TO PAY FOR THE VERY BASICS LIKE FOOD, HOUSING, MEDICAL CARE, AND HEATING?: NOT VERY HARD
HOW OFTEN DO YOU ATTENT MEETINGS OF THE CLUB OR ORGANIZATION YOU BELONG TO?: NEVER
HOW OFTEN DO YOU HAVE A DRINK CONTAINING ALCOHOL: 4 OR MORE TIMES A WEEK
DO YOU BELONG TO ANY CLUBS OR ORGANIZATIONS SUCH AS CHURCH GROUPS UNIONS, FRATERNAL OR ATHLETIC GROUPS, OR SCHOOL GROUPS?: NO
IN A TYPICAL WEEK, HOW MANY TIMES DO YOU TALK ON THE PHONE WITH FAMILY, FRIENDS, OR NEIGHBORS?: ONCE A WEEK
HOW OFTEN DO YOU HAVE SIX OR MORE DRINKS ON ONE OCCASION: NEVER
HOW OFTEN DO YOU GET TOGETHER WITH FRIENDS OR RELATIVES?: MORE THAN THREE TIMES A WEEK
DO YOU BELONG TO ANY CLUBS OR ORGANIZATIONS SUCH AS CHURCH GROUPS UNIONS, FRATERNAL OR ATHLETIC GROUPS, OR SCHOOL GROUPS?: NO
HOW OFTEN DO YOU ATTEND CHURCH OR RELIGIOUS SERVICES?: 1 TO 4 TIMES PER YEAR
HOW OFTEN DO YOU GET TOGETHER WITH FRIENDS OR RELATIVES?: MORE THAN THREE TIMES A WEEK
HOW OFTEN DO YOU ATTENT MEETINGS OF THE CLUB OR ORGANIZATION YOU BELONG TO?: NEVER
WITHIN THE PAST 12 MONTHS, YOU WORRIED THAT YOUR FOOD WOULD RUN OUT BEFORE YOU GOT THE MONEY TO BUY MORE: NEVER TRUE
HOW MANY DRINKS CONTAINING ALCOHOL DO YOU HAVE ON A TYPICAL DAY WHEN YOU ARE DRINKING: 3 OR 4
IN A TYPICAL WEEK, HOW MANY TIMES DO YOU TALK ON THE PHONE WITH FAMILY, FRIENDS, OR NEIGHBORS?: ONCE A WEEK
HOW OFTEN DO YOU ATTEND CHURCH OR RELIGIOUS SERVICES?: 1 TO 4 TIMES PER YEAR

## 2022-09-14 ENCOUNTER — OFFICE VISIT (OUTPATIENT)
Dept: MEDICAL GROUP | Facility: LAB | Age: 77
End: 2022-09-14
Payer: MEDICARE

## 2022-09-14 VITALS
RESPIRATION RATE: 16 BRPM | SYSTOLIC BLOOD PRESSURE: 112 MMHG | HEART RATE: 88 BPM | DIASTOLIC BLOOD PRESSURE: 74 MMHG | OXYGEN SATURATION: 96 % | BODY MASS INDEX: 32.61 KG/M2 | HEIGHT: 69 IN | TEMPERATURE: 97.3 F | WEIGHT: 220.2 LBS

## 2022-09-14 DIAGNOSIS — R39.16 BENIGN PROSTATIC HYPERPLASIA (BPH) WITH STRAINING ON URINATION: ICD-10-CM

## 2022-09-14 DIAGNOSIS — R53.83 FATIGUE, UNSPECIFIED TYPE: ICD-10-CM

## 2022-09-14 DIAGNOSIS — R05.3 CHRONIC COUGH: ICD-10-CM

## 2022-09-14 DIAGNOSIS — N40.1 BENIGN PROSTATIC HYPERPLASIA (BPH) WITH STRAINING ON URINATION: ICD-10-CM

## 2022-09-14 PROBLEM — Z00.00 HEALTHCARE MAINTENANCE: Status: RESOLVED | Noted: 2019-12-09 | Resolved: 2022-09-14

## 2022-09-14 PROCEDURE — 99214 OFFICE O/P EST MOD 30 MIN: CPT | Performed by: FAMILY MEDICINE

## 2022-09-14 ASSESSMENT — FIBROSIS 4 INDEX: FIB4 SCORE: 1.09

## 2022-09-14 NOTE — PROGRESS NOTES
Benedict Marmolejo is a 76 y.o. male here to establish care and discuss cough, fatigue, urinary symptoms.    HPI:      Cough  Several months, occasional phlegm. No trouble breathing or wheezing.  Using flonase for last year  Tried inhaler in past with no improvement  CXR normal, previous PCP had recommended PFTs but they had decided to hold off.  Does have history of smoking, quit in .    Urinating  Occasional with difficulty urinating at night, seems related to certain foods  On flomax and finasteride and this continues.  Happens 1-2x monthly    Fatigue  Afternoon fatigue happens often. Not waking up rested  He does snore. STOP BANG score of 6. No prior sleep apnea testing    Current medicines (including changes today)  Current Outpatient Medications   Medication Sig Dispense Refill    fluticasone (FLONASE) 50 MCG/ACT nasal spray Administer 1 Spray into affected nostril(S) every day.      tamsulosin (FLOMAX) 0.4 MG capsule TAKE 1 CAPSULE DAILY 1/2 HOUR AFTER BREAKFAST. 90 Capsule 3    atorvastatin (LIPITOR) 20 MG Tab TAKE 1 TABLET EVERY DAY 90 Tablet 3    lisinopril (PRINIVIL) 5 MG Tab TAKE 1 TABLET EVERY DAY 90 Tablet 3    levothyroxine (SYNTHROID) 50 MCG Tab TAKE 1 TABLET EVERY  Tablet 4    finasteride (PROSCAR) 5 MG Tab Take 1 tablet by mouth every day. 90 tablet 4    VITAMIN D PO Take 2,000 Int'l Units by mouth every 48 hours.      Efinaconazole (JUBLIA) 10 % Solution Apply to toenails nightly x 48 weeks. (Patient not taking: No sig reported) 8 mL 1     No current facility-administered medications for this visit.     He  has a past medical history of Diabetes (HCC), Hyperlipidemia, Hypertension, and Thyroid disease.  He  has a past surgical history that includes eye surgery.  Social History     Tobacco Use    Smoking status: Former     Types: Cigarettes     Quit date:      Years since quittin.7    Smokeless tobacco: Never   Vaping Use    Vaping Use: Never used   Substance Use Topics     "Alcohol use: Yes     Alcohol/week: 1.2 - 1.8 oz     Types: 2 - 3 Glasses of wine per week    Drug use: No     Social History     Social History Narrative    Not on file     Family History   Family history unknown: Yes     No family status information on file.       ROS  See HPI     Objective:     Physical Exam:  /74 (BP Location: Left arm, Patient Position: Sitting, BP Cuff Size: Adult)   Pulse 88   Temp 36.3 °C (97.3 °F)   Resp 16   Ht 1.74 m (5' 8.5\")   Wt 99.9 kg (220 lb 3.2 oz)   SpO2 96%  Body mass index is 32.99 kg/m².  Constitutional: Alert. Well appearing. No distress.  Skin: Warm, dry, good turgor, no visible rashes.  Eye: Equal, round and reactive to light, conjunctiva clear, lids normal.  ENMT: Moist mucous membranes.   Neck: Trachea midline, no masses, no thyromegaly.   Respiratory: Normal effort. Lungs are clear to auscultation bilaterally.  Cardiovascular: Regular rate and rhythm. Normal S1/S2. No murmurs, rubs or gallops.   Neuro: Moves all four extremities. No facial droop.  Psych: Answers questions appropriately. Normal affect and mood.    Assessment and Plan:     1. Chronic cough  Cough for at least a few months, occasionally productive of phlegm.  Not respond to Flonase or inhaler in the past.  History of smoking, quit 1990.  CXR normal.  Possible COPD, will order PFTs.  - PULMONARY FUNCTION TESTS -Test requested: Complete Pulmonary Function Test; Include MIPS/MEPS? Yes; Future    2. Benign prostatic hyperplasia (BPH) with straining on urination  Continued straining at night despite Flomax and finasteride.  Prior PCP had sent referral to urology and recommend he follow-up to schedule visit with them.  Considered increasing Flomax dose but does have history of dizziness and he has not necessarily seen significant benefit from medications.    3. Fatigue, unspecified type  Chronic, daytime fatigue, especially in the afternoon.  Not was waking up feeling rested.  STOP-BANG score of 6.  " Discussed sleep study but he would prefer to hold off at this point.  Can revisit in the future.    Records requested from previous PCP.  Follow up: No follow-ups on file.         PLEASE NOTE: This note was created using voice recognition software.

## 2022-09-25 DIAGNOSIS — R35.1 NOCTURIA: ICD-10-CM

## 2022-09-26 RX ORDER — FINASTERIDE 5 MG/1
TABLET, FILM COATED ORAL
Qty: 90 TABLET | Refills: 4 | Status: SHIPPED | OUTPATIENT
Start: 2022-09-26 | End: 2023-10-31 | Stop reason: SDUPTHER

## 2022-09-26 NOTE — TELEPHONE ENCOUNTER
Received request via: Pharmacy    Was the patient seen in the last year in this department? Yes 9/14/22    Does the patient have an active prescription (recently filled or refills available) for medication(s) requested? No

## 2022-10-01 DIAGNOSIS — E03.9 HYPOTHYROIDISM, UNSPECIFIED TYPE: ICD-10-CM

## 2022-10-04 RX ORDER — LEVOTHYROXINE SODIUM 0.05 MG/1
TABLET ORAL
Qty: 90 TABLET | Refills: 3 | Status: SHIPPED | OUTPATIENT
Start: 2022-10-04 | End: 2023-10-31 | Stop reason: SDUPTHER

## 2022-11-09 DIAGNOSIS — I10 ESSENTIAL HYPERTENSION: ICD-10-CM

## 2022-11-09 DIAGNOSIS — E78.5 DYSLIPIDEMIA: ICD-10-CM

## 2022-11-10 ENCOUNTER — PATIENT MESSAGE (OUTPATIENT)
Dept: HEALTH INFORMATION MANAGEMENT | Facility: OTHER | Age: 77
End: 2022-11-10

## 2022-11-11 ENCOUNTER — HOSPITAL ENCOUNTER (OUTPATIENT)
Dept: CARDIOLOGY | Facility: MEDICAL CENTER | Age: 77
End: 2022-11-11
Attending: INTERNAL MEDICINE
Payer: MEDICARE

## 2022-11-11 DIAGNOSIS — R42 DIZZINESS: ICD-10-CM

## 2022-11-11 DIAGNOSIS — R06.09 DYSPNEA ON EXERTION: ICD-10-CM

## 2022-11-11 PROCEDURE — 93306 TTE W/DOPPLER COMPLETE: CPT

## 2022-11-11 RX ORDER — ATORVASTATIN CALCIUM 20 MG/1
TABLET, FILM COATED ORAL
Qty: 90 TABLET | Refills: 3 | Status: SHIPPED | OUTPATIENT
Start: 2022-11-11 | End: 2023-10-31 | Stop reason: SDUPTHER

## 2022-11-11 RX ORDER — LISINOPRIL 5 MG/1
TABLET ORAL
Qty: 90 TABLET | Refills: 3 | Status: SHIPPED | OUTPATIENT
Start: 2022-11-11 | End: 2023-10-31 | Stop reason: SDUPTHER

## 2022-11-11 NOTE — TELEPHONE ENCOUNTER
Received request via: Pharmacy    Was the patient seen in the last year in this department? Yes  LOV 09/14/2022  Does the patient have an active prescription (recently filled or refills available) for medication(s) requested? No    Does the patient have retirement Plus and need 100 day supply (blood pressure, diabetes and cholesterol meds only)? Patient does not have SCP

## 2022-11-14 LAB — LV EJECT FRACT  99904: 60

## 2022-11-14 PROCEDURE — 93306 TTE W/DOPPLER COMPLETE: CPT | Mod: 26 | Performed by: INTERNAL MEDICINE

## 2022-12-06 DIAGNOSIS — R35.1 NOCTURIA: ICD-10-CM

## 2022-12-07 RX ORDER — TAMSULOSIN HYDROCHLORIDE 0.4 MG/1
CAPSULE ORAL
Qty: 90 CAPSULE | Refills: 3 | Status: SHIPPED | OUTPATIENT
Start: 2022-12-07 | End: 2023-10-31 | Stop reason: SDUPTHER

## 2022-12-07 NOTE — TELEPHONE ENCOUNTER
Received request via: Pharmacy  9/14/22  Was the patient seen in the last year in this department? Yes    Does the patient have an active prescription (recently filled or refills available) for medication(s) requested? No    Does the patient have nursing home Plus and need 100 day supply (blood pressure, diabetes and cholesterol meds only)? Patient does not have SCP

## 2023-04-14 ENCOUNTER — OFFICE VISIT (OUTPATIENT)
Dept: URGENT CARE | Facility: CLINIC | Age: 78
End: 2023-04-14
Payer: MEDICARE

## 2023-04-14 VITALS
DIASTOLIC BLOOD PRESSURE: 78 MMHG | BODY MASS INDEX: 32.58 KG/M2 | HEIGHT: 69 IN | WEIGHT: 220 LBS | SYSTOLIC BLOOD PRESSURE: 136 MMHG | HEART RATE: 98 BPM | OXYGEN SATURATION: 95 % | RESPIRATION RATE: 20 BRPM | TEMPERATURE: 98.3 F

## 2023-04-14 DIAGNOSIS — J01.00 ACUTE NON-RECURRENT MAXILLARY SINUSITIS: ICD-10-CM

## 2023-04-14 DIAGNOSIS — H10.32 ACUTE BACTERIAL CONJUNCTIVITIS OF LEFT EYE: ICD-10-CM

## 2023-04-14 PROCEDURE — 99213 OFFICE O/P EST LOW 20 MIN: CPT | Performed by: NURSE PRACTITIONER

## 2023-04-14 RX ORDER — AMOXICILLIN AND CLAVULANATE POTASSIUM 875; 125 MG/1; MG/1
1 TABLET, FILM COATED ORAL 2 TIMES DAILY
Qty: 14 TABLET | Refills: 0 | Status: SHIPPED | OUTPATIENT
Start: 2023-04-14 | End: 2023-04-21

## 2023-04-14 RX ORDER — OFLOXACIN 3 MG/ML
1 SOLUTION/ DROPS OPHTHALMIC 4 TIMES DAILY
Qty: 10 ML | Refills: 0 | Status: SHIPPED
Start: 2023-04-14 | End: 2023-09-19

## 2023-04-14 ASSESSMENT — FIBROSIS 4 INDEX: FIB4 SCORE: 1.1

## 2023-04-14 NOTE — PROGRESS NOTES
Chief Complaint   Patient presents with    Eye Problem         HISTORY OF PRESENT ILLNESS: Patient is a pleasant 77 y.o. male who presents today due to four days of nasal congestion and sinus pressure. He denies associated cough, difficulty breathing, confusion, nausea, vomiting or diarrhea.  He notes onset of left-sided eye erythema, irritation, drainage since yesterday.  Denies any visual changes.  He wears glasses, not contacts.  Denies a history of seasonal allergies or sinus infections in the past. No known ill contacts at home. No recent antibiotic usage.       Patient Active Problem List    Diagnosis Date Noted    Dyspnea on exertion 07/27/2022    Fatigue 05/03/2022    Dizziness 05/03/2022    Cough 01/07/2021    Nocturia 01/07/2021    Finger pain 11/15/2018    Sleep paralysis 11/15/2018    Left shoulder pain 11/15/2018    Onychomycosis 11/15/2018    Dyslipidemia 11/15/2018    Hypothyroid 11/15/2018    Essential hypertension 11/15/2018       Allergies:Patient has no known allergies.    Current Outpatient Medications Ordered in Epic   Medication Sig Dispense Refill    amoxicillin-clavulanate (AUGMENTIN) 875-125 MG Tab Take 1 Tablet by mouth 2 times a day for 7 days. 14 Tablet 0    ofloxacin (OCUFLOX) 0.3 % Solution Administer 1 Drop into the left eye 4 times a day. 10 mL 0    tamsulosin (FLOMAX) 0.4 MG capsule TAKE 1 CAPSULE DAILY 1/2 HOUR AFTER BREAKFAST. 90 Capsule 3    atorvastatin (LIPITOR) 20 MG Tab TAKE 1 TABLET EVERY DAY 90 Tablet 3    lisinopril (PRINIVIL) 5 MG Tab TAKE 1 TABLET EVERY DAY 90 Tablet 3    levothyroxine (SYNTHROID) 50 MCG Tab TAKE 1 TABLET EVERY DAY 90 Tablet 3    finasteride (PROSCAR) 5 MG Tab TAKE 1 TABLET EVERY DAY 90 Tablet 4    fluticasone (FLONASE) 50 MCG/ACT nasal spray Administer 1 Spray into affected nostril(S) every day.      VITAMIN D PO Take 2,000 Int'l Units by mouth every 48 hours.      Efinaconazole (JUBLIA) 10 % Solution Apply to toenails nightly x 48 weeks. (Patient not  "taking: Reported on 2023) 8 mL 1     No current [facility]-ordered facility-administered medications on file.       Past Medical History:   Diagnosis Date    Diabetes (HCC)     Hyperlipidemia     Hypertension     Thyroid disease        Social History     Tobacco Use    Smoking status: Former     Types: Cigarettes     Quit date:      Years since quittin.3    Smokeless tobacco: Never   Vaping Use    Vaping Use: Never used   Substance Use Topics    Alcohol use: Yes     Alcohol/week: 1.2 - 1.8 oz     Types: 2 - 3 Glasses of wine per week    Drug use: No       No family status information on file.     Family History   Family history unknown: Yes       ROS:  Review of Systems   Constitutional: Negative for fever, chills, fatigue. Negative for weight loss.   HENT: Positive for sinus pressure, nasal congestion. Negative for ear pain, nosebleeds, neck pain.    Eyes: Positive for left eye erythema, irritation, drainage.  Negative for vision changes.   Neuro: Negative for sensory changes, weakness, seizure, LOC.  Cardiovascular: Negative for chest pain, palpitations, orthopnea and leg swelling.   Respiratory: Negative for cough, sputum production, shortness of breath and wheezing.   Gastrointestinal: Negative for abdominal pain, nausea, vomiting or diarrhea.    Skin: Negative for rash, diaphoresis.     Exam:  /78 (BP Location: Left arm, Patient Position: Sitting, BP Cuff Size: Adult)   Pulse 98   Temp 36.8 °C (98.3 °F) (Temporal)   Resp 20   Ht 1.753 m (5' 9\")   Wt 99.8 kg (220 lb)   SpO2 95%   General: well-nourished, well-developed male in NAD  Head: normocephalic, atraumatic  Eyes: PERRLA, left-sided conjunctival injection, acuity grossly intact, lids normal.  Fluorescein examination negative for uptake.  Ears: normal shape and symmetry, no tenderness, no discharge. External canals are without any significant edema or erythema. Tympanic membranes are without any inflammation, no effusion. Gross auditory " acuity is intact.  Nose: symmetrical without tenderness, erythema and swelling noted bilateral turbinates, clear discharge.  Left maxillary sinus pressure with examination.   Mouth/Throat: reasonable hygiene, no exudates or tonsillar enlargement. Erythema is present.   Neck: no masses, range of motion within normal limits, no tracheal deviation. No obvious thyroid enlargement.   Lymph: no cervical adenopathy. No supraclavicular adenopathy.   Neuro: alert and oriented. Cranial nerves 1-12 grossly intact. No sensory deficit.   Cardiovascular: regular rate and rhythm. No edema.  Pulmonary: no distress. Chest is symmetrical with respiration, no wheezes, crackles, or rhonchi.   Musculoskeletal: no clubbing, appropriate muscle tone, gait is stable.  Skin: warm, dry, intact, no clubbing, no cyanosis, no rashes.   Psych: appropriate mood, affect, judgement.     Visual acuity:  Both eyes 20/20  Right eye 20/40  Left eye 2020      Assessment/Plan:  1. Acute non-recurrent maxillary sinusitis  ofloxacin (OCUFLOX) 0.3 % Solution      2. Acute bacterial conjunctivitis of left eye  amoxicillin-clavulanate (AUGMENTIN) 875-125 MG Tab          Suspect sinusitis and conjunctivitis.  Ofloxacin and Augmentin as directed.  May consider OTC oral allergy medication as well.  If no eye improvement in 24 to 48 hours, instructed to follow-up with ER for further evaluation and care.  Sleep with HOB elevated, humidifier at night, rest, increase fluid intake.   Supportive care, differential diagnoses, and indications for immediate follow-up discussed with patient.   Pathogenesis of diagnosis discussed including typical length and natural progression.   Instructed to return to clinic or nearest emergency department for any change in condition, further concerns, or worsening of symptoms.  Patient states understanding of the plan of care and discharge instructions.  Instructed to make an appointment, for follow up, with his primary care  provider.        Please note that this dictation was created using voice recognition software. I have made every reasonable attempt to correct obvious errors, but I expect that there are errors of grammar and possibly content that I did not discover before finalizing the note.       SARINA Grant.

## 2023-08-21 ENCOUNTER — TELEPHONE (OUTPATIENT)
Dept: MEDICAL GROUP | Facility: LAB | Age: 78
End: 2023-08-21
Payer: MEDICARE

## 2023-08-21 NOTE — TELEPHONE ENCOUNTER
Called back patient informed him that annual labs will not be covered by medicare if completed prior to his annual exam, His main concern for ordering labs is he noticed hair loss, Concerned about [place]'s disease.     Please advise offered to schedule visit prior to his annual, declined for now given PCP [facility].

## 2023-08-30 NOTE — TELEPHONE ENCOUNTER
Called and spoke to patient. He was very upset that he couldn't get his yearly lab orders now and labs in regards to his hair loss. He thinks it would be a wasted appointment and he would need to come back to do labs anyway. Advised we had lab services at the same office and labs could be completed after the appointment with PCP. Patient also got upset when he was told the appointment was an annual. He stated we forced that appointment on him and he didn't want to do the annual but just wanted to talk about hair loss and how it's most likely and endocrine issue. Patient said he'll come to his appointment in 09/13 maybe or he'll just find a new doctor. Advised patient he could do whatever worked best for him. Appointment was changed to a regular appointment to discuss hair loss. Patient then hung up.

## 2023-09-19 ENCOUNTER — OFFICE VISIT (OUTPATIENT)
Dept: MEDICAL GROUP | Facility: LAB | Age: 78
End: 2023-09-19
Payer: MEDICARE

## 2023-09-19 VITALS
HEIGHT: 69 IN | OXYGEN SATURATION: 96 % | DIASTOLIC BLOOD PRESSURE: 60 MMHG | HEART RATE: 89 BPM | TEMPERATURE: 98.8 F | RESPIRATION RATE: 12 BRPM | BODY MASS INDEX: 32.58 KG/M2 | SYSTOLIC BLOOD PRESSURE: 110 MMHG | WEIGHT: 220 LBS

## 2023-09-19 DIAGNOSIS — Z12.11 SCREEN FOR COLON CANCER: ICD-10-CM

## 2023-09-19 DIAGNOSIS — Z12.5 SCREENING FOR PROSTATE CANCER: ICD-10-CM

## 2023-09-19 DIAGNOSIS — E55.9 VITAMIN D DEFICIENCY: ICD-10-CM

## 2023-09-19 DIAGNOSIS — E03.9 HYPOTHYROIDISM, UNSPECIFIED TYPE: ICD-10-CM

## 2023-09-19 DIAGNOSIS — E78.5 DYSLIPIDEMIA: ICD-10-CM

## 2023-09-19 DIAGNOSIS — E34.9 TESTOSTERONE DEFICIENCY: ICD-10-CM

## 2023-09-19 PROCEDURE — 99214 OFFICE O/P EST MOD 30 MIN: CPT | Performed by: INTERNAL MEDICINE

## 2023-09-19 PROCEDURE — 3078F DIAST BP <80 MM HG: CPT | Performed by: INTERNAL MEDICINE

## 2023-09-19 PROCEDURE — 3074F SYST BP LT 130 MM HG: CPT | Performed by: INTERNAL MEDICINE

## 2023-09-19 ASSESSMENT — FIBROSIS 4 INDEX: FIB4 SCORE: 1.1

## 2023-09-19 ASSESSMENT — PATIENT HEALTH QUESTIONNAIRE - PHQ9: CLINICAL INTERPRETATION OF PHQ2 SCORE: 0

## 2023-09-20 NOTE — PROGRESS NOTES
CC: Benedict Marmolejo is a 77 y.o. male is suffering from   Chief Complaint   Patient presents with    Hair Loss     Pt is losing hair in a few spots    Requesting Labs         SUBJECTIVE:  1. Screen for colon cancer  Patient is in need of screening for colon cancer referral written    2. Dyslipidemia  History of dyslipidemia no change in medical therapy    3. Screening for prostate cancer  Screening for prostate cancer ordered    4. Hypothyroidism, unspecified type  Recheck thyroid    5. Testosterone deficiency  Concerns about possible hypergonadism    6. Vitamin D deficiency  Recheck vitamin D        Past social, family, history: Retired  [employer] retired [facility] [employer] [facility]  Social History     Tobacco Use    Smoking status: Former     Current packs/day: 0.00     Types: Cigarettes     Quit date:      Years since quittin.7    Smokeless tobacco: Never   Vaping Use    Vaping Use: Never used   Substance Use Topics    Alcohol use: Yes     Alcohol/week: 1.2 - 1.8 oz     Types: 2 - 3 Glasses of wine per week    Drug use: No         MEDICATIONS:    Current Outpatient Medications:     tamsulosin (FLOMAX) 0.4 MG capsule, TAKE 1 CAPSULE DAILY 1/2 HOUR AFTER BREAKFAST., Disp: 90 Capsule, Rfl: 3    atorvastatin (LIPITOR) 20 MG Tab, TAKE 1 TABLET EVERY DAY, Disp: 90 Tablet, Rfl: 3    lisinopril (PRINIVIL) 5 MG Tab, TAKE 1 TABLET EVERY DAY, Disp: 90 Tablet, Rfl: 3    levothyroxine (SYNTHROID) 50 MCG Tab, TAKE 1 TABLET EVERY DAY, Disp: 90 Tablet, Rfl: 3    finasteride (PROSCAR) 5 MG Tab, TAKE 1 TABLET EVERY DAY, Disp: 90 Tablet, Rfl: 4    fluticasone (FLONASE) 50 MCG/ACT nasal spray, Administer 1 Spray into affected nostril(S) every day., Disp: , Rfl:     VITAMIN D PO, Take 2,000 Int'l Units by mouth every 48 hours., Disp: , Rfl:     PROBLEMS:  Patient Active Problem List    Diagnosis Date Noted    Dyspnea on exertion 2022    Fatigue 2022    Dizziness 2022    Cough 2021    Nocturia 2021     "Finger pain 11/15/2018    Sleep paralysis 11/15/2018    Left shoulder pain 11/15/2018    Onychomycosis 11/15/2018    Dyslipidemia 11/15/2018    Hypothyroid 11/15/2018    Essential hypertension 11/15/2018       REVIEW OF SYSTEMS:  Gen.:  No Nausea, Vomiting, fever, Chills.  Heart: No chest pain.  Lungs:  No shortness of Breath.  Psychological: Otton[place] unusual Anxiety depression     PHYSICAL EXAM   Constitutional: Alert, cooperative, not in acute distress.  Cardiovascular:  Rate Rhythm is regular without murmurs rubs clicks.     Thorax & Lungs: Clear to auscultation, no wheezing, rhonchi, or rales  HENT: Normocephalic, Atraumatic.  Eyes: PERRLA, EOMI, Conjunctiva normal.   Neck: Trachia is midline no swelling of the thyroid.   Lymphatic: No lymphadenopathy noted.   Neurologic: Alert & oriented x 3, cranial nerves II through XII are intact, Normal motor function, Normal sensory function, No focal deficits noted.   Psychiatric: Affect normal, Judgment normal, Mood normal.     VITAL SIGNS:/60   Pulse 89   Temp 37.1 °C (98.8 °F) (Temporal)   Resp 12   Ht 1.753 m (5' 9\")   Wt 99.8 kg (220 lb)   SpO2 96%   BMI 32.49 kg/m²     Labs: Reviewed    Assessment:                                                     Plan:    1. Screen for colon cancer  Referral written  - Referral to GI for Colonoscopy  - Comp Metabolic Panel; Future  - CBC WITH DIFFERENTIAL; Future    2. Dyslipidemia  Recheck lipid profile  - Lipid Profile; Future  - Comp Metabolic Panel; Future  - CBC WITH DIFFERENTIAL; Future    3. Screening for prostate cancer  Screening prostate cancer  - PROSTATE SPECIFIC AG SCREENING; Future  - Comp Metabolic Panel; Future  - CBC WITH DIFFERENTIAL; Future    4. Hypothyroidism, unspecified type  Screening hypothyroidism  - TSH; Future  - FREE THYROXINE; Future    5. Testosterone deficiency  Patient with loss of hair concerning for possible problems associated with low testosterone  - TESTOSTERONE SERUM; Future    6. " Vitamin D deficiency  Recheck vitamin D  - VITAMIN D,25 HYDROXY (DEFICIENCY); Future

## 2023-10-10 ENCOUNTER — HOSPITAL ENCOUNTER (OUTPATIENT)
Dept: LAB | Facility: MEDICAL CENTER | Age: 78
End: 2023-10-10
Attending: INTERNAL MEDICINE
Payer: MEDICARE

## 2023-10-10 DIAGNOSIS — E55.9 VITAMIN D DEFICIENCY: ICD-10-CM

## 2023-10-10 DIAGNOSIS — E78.5 DYSLIPIDEMIA: ICD-10-CM

## 2023-10-10 DIAGNOSIS — Z12.5 SCREENING FOR PROSTATE CANCER: ICD-10-CM

## 2023-10-10 DIAGNOSIS — E03.9 HYPOTHYROIDISM, UNSPECIFIED TYPE: ICD-10-CM

## 2023-10-10 DIAGNOSIS — E34.9 TESTOSTERONE DEFICIENCY: ICD-10-CM

## 2023-10-10 DIAGNOSIS — Z12.11 SCREEN FOR COLON CANCER: ICD-10-CM

## 2023-10-10 LAB
25(OH)D3 SERPL-MCNC: 37 NG/ML (ref 30–100)
ALBUMIN SERPL BCP-MCNC: 4.1 G/DL (ref 3.2–4.9)
ALBUMIN/GLOB SERPL: 1.9 G/DL
ALP SERPL-CCNC: 77 U/L (ref 30–99)
ALT SERPL-CCNC: 25 U/L (ref 2–50)
ANION GAP SERPL CALC-SCNC: 8 MMOL/L (ref 7–16)
AST SERPL-CCNC: 15 U/L (ref 12–45)
BASOPHILS # BLD AUTO: 0.3 % (ref 0–1.8)
BASOPHILS # BLD: 0.02 K/UL (ref 0–0.12)
BILIRUB SERPL-MCNC: 0.5 MG/DL (ref 0.1–1.5)
BUN SERPL-MCNC: 14 MG/DL (ref 8–22)
CALCIUM ALBUM COR SERPL-MCNC: 8.8 MG/DL (ref 8.5–10.5)
CALCIUM SERPL-MCNC: 8.9 MG/DL (ref 8.5–10.5)
CHLORIDE SERPL-SCNC: 103 MMOL/L (ref 96–112)
CHOLEST SERPL-MCNC: 157 MG/DL (ref 100–199)
CO2 SERPL-SCNC: 26 MMOL/L (ref 20–33)
CREAT SERPL-MCNC: 0.77 MG/DL (ref 0.5–1.4)
EOSINOPHIL # BLD AUTO: 0.04 K/UL (ref 0–0.51)
EOSINOPHIL NFR BLD: 0.6 % (ref 0–6.9)
ERYTHROCYTE [DISTWIDTH] IN BLOOD BY AUTOMATED COUNT: 44.2 FL (ref 35.9–50)
FASTING STATUS PATIENT QL REPORTED: NORMAL
GFR SERPLBLD CREATININE-BSD FMLA CKD-EPI: 93 ML/MIN/1.73 M 2
GLOBULIN SER CALC-MCNC: 2.2 G/DL (ref 1.9–3.5)
GLUCOSE SERPL-MCNC: 108 MG/DL (ref 65–99)
HCT VFR BLD AUTO: 50.8 % (ref 42–52)
HDLC SERPL-MCNC: 45 MG/DL
HGB BLD-MCNC: 17.3 G/DL (ref 14–18)
IMM GRANULOCYTES # BLD AUTO: 0.02 K/UL (ref 0–0.11)
IMM GRANULOCYTES NFR BLD AUTO: 0.3 % (ref 0–0.9)
LDLC SERPL CALC-MCNC: 77 MG/DL
LYMPHOCYTES # BLD AUTO: 1.95 K/UL (ref 1–4.8)
LYMPHOCYTES NFR BLD: 30.9 % (ref 22–41)
MCH RBC QN AUTO: 33.7 PG (ref 27–33)
MCHC RBC AUTO-ENTMCNC: 34.1 G/DL (ref 32.3–36.5)
MCV RBC AUTO: 99 FL (ref 81.4–97.8)
MONOCYTES # BLD AUTO: 0.42 K/UL (ref 0–0.85)
MONOCYTES NFR BLD AUTO: 6.6 % (ref 0–13.4)
NEUTROPHILS # BLD AUTO: 3.87 K/UL (ref 1.82–7.42)
NEUTROPHILS NFR BLD: 61.3 % (ref 44–72)
NRBC # BLD AUTO: 0 K/UL
NRBC BLD-RTO: 0 /100 WBC (ref 0–0.2)
PLATELET # BLD AUTO: 207 K/UL (ref 164–446)
PMV BLD AUTO: 8.6 FL (ref 9–12.9)
POTASSIUM SERPL-SCNC: 5.1 MMOL/L (ref 3.6–5.5)
PROT SERPL-MCNC: 6.3 G/DL (ref 6–8.2)
PSA SERPL-MCNC: 0.83 NG/ML (ref 0–4)
RBC # BLD AUTO: 5.13 M/UL (ref 4.7–6.1)
SODIUM SERPL-SCNC: 137 MMOL/L (ref 135–145)
T4 FREE SERPL-MCNC: 1.61 NG/DL (ref 0.93–1.7)
TESTOST SERPL-MCNC: 636 NG/DL (ref 175–781)
TRIGL SERPL-MCNC: 174 MG/DL (ref 0–149)
TSH SERPL DL<=0.005 MIU/L-ACNC: 4.66 UIU/ML (ref 0.38–5.33)
WBC # BLD AUTO: 6.3 K/UL (ref 4.8–10.8)

## 2023-10-10 PROCEDURE — 36415 COLL VENOUS BLD VENIPUNCTURE: CPT

## 2023-10-10 PROCEDURE — 85025 COMPLETE CBC W/AUTO DIFF WBC: CPT

## 2023-10-10 PROCEDURE — 80061 LIPID PANEL: CPT

## 2023-10-10 PROCEDURE — 80053 COMPREHEN METABOLIC PANEL: CPT

## 2023-10-10 PROCEDURE — 84403 ASSAY OF TOTAL TESTOSTERONE: CPT

## 2023-10-10 PROCEDURE — 84153 ASSAY OF PSA TOTAL: CPT | Mod: GA

## 2023-10-10 PROCEDURE — 84443 ASSAY THYROID STIM HORMONE: CPT

## 2023-10-10 PROCEDURE — 82306 VITAMIN D 25 HYDROXY: CPT

## 2023-10-10 PROCEDURE — 84439 ASSAY OF FREE THYROXINE: CPT

## 2023-10-31 ENCOUNTER — OFFICE VISIT (OUTPATIENT)
Dept: MEDICAL GROUP | Facility: LAB | Age: 78
End: 2023-10-31
Payer: MEDICARE

## 2023-10-31 VITALS
RESPIRATION RATE: 12 BRPM | SYSTOLIC BLOOD PRESSURE: 120 MMHG | WEIGHT: 215 LBS | OXYGEN SATURATION: 95 % | HEART RATE: 94 BPM | BODY MASS INDEX: 31.84 KG/M2 | DIASTOLIC BLOOD PRESSURE: 60 MMHG | HEIGHT: 69 IN | TEMPERATURE: 97.8 F

## 2023-10-31 DIAGNOSIS — R35.1 NOCTURIA: ICD-10-CM

## 2023-10-31 DIAGNOSIS — J30.2 SEASONAL ALLERGIES: ICD-10-CM

## 2023-10-31 DIAGNOSIS — E78.5 DYSLIPIDEMIA: ICD-10-CM

## 2023-10-31 DIAGNOSIS — I10 ESSENTIAL HYPERTENSION: ICD-10-CM

## 2023-10-31 DIAGNOSIS — E03.9 HYPOTHYROIDISM, UNSPECIFIED TYPE: ICD-10-CM

## 2023-10-31 PROCEDURE — 3074F SYST BP LT 130 MM HG: CPT | Performed by: INTERNAL MEDICINE

## 2023-10-31 PROCEDURE — 3078F DIAST BP <80 MM HG: CPT | Performed by: INTERNAL MEDICINE

## 2023-10-31 PROCEDURE — 99214 OFFICE O/P EST MOD 30 MIN: CPT | Performed by: INTERNAL MEDICINE

## 2023-10-31 RX ORDER — ATORVASTATIN CALCIUM 20 MG/1
20 TABLET, FILM COATED ORAL
Qty: 90 TABLET | Refills: 3 | Status: SHIPPED | OUTPATIENT
Start: 2023-10-31

## 2023-10-31 RX ORDER — TAMSULOSIN HYDROCHLORIDE 0.4 MG/1
CAPSULE ORAL
Qty: 90 CAPSULE | Refills: 3 | Status: SHIPPED | OUTPATIENT
Start: 2023-10-31

## 2023-10-31 RX ORDER — FINASTERIDE 5 MG/1
5 TABLET, FILM COATED ORAL
Qty: 90 TABLET | Refills: 4 | Status: SHIPPED | OUTPATIENT
Start: 2023-10-31

## 2023-10-31 RX ORDER — FLUTICASONE PROPIONATE 50 MCG
1 SPRAY, SUSPENSION (ML) NASAL DAILY
Qty: 16 G | Refills: 11 | Status: SHIPPED | OUTPATIENT
Start: 2023-10-31

## 2023-10-31 RX ORDER — LISINOPRIL 5 MG/1
5 TABLET ORAL
Qty: 90 TABLET | Refills: 3 | Status: SHIPPED | OUTPATIENT
Start: 2023-10-31 | End: 2024-10-25

## 2023-10-31 RX ORDER — LEVOTHYROXINE SODIUM 0.05 MG/1
50 TABLET ORAL
Qty: 90 TABLET | Refills: 3 | Status: SHIPPED | OUTPATIENT
Start: 2023-10-31

## 2023-10-31 ASSESSMENT — FIBROSIS 4 INDEX: FIB4 SCORE: 1.086956521739130435

## 2023-10-31 NOTE — PROGRESS NOTES
CC: Benedict Marmolejo is a 77 y.o. male is suffering from   Chief Complaint   Patient presents with    Lab Results    Ear Fullness         SUBJECTIVE:  1. Nocturia  Benedict is here for follow-up is doing well, continues to struggle with nocturia continue current medical therapy    2. Dyslipidemia  Recheck lipid profile in 6 months    3. Essential hypertension  Continue current medical therapy    4. Hypothyroidism, unspecified type  Stable    5. Seasonal allergies  Stable        Past social, family, history: Retired [employer]  Social History     Tobacco Use    Smoking status: Former     Current packs/day: 0.00     Types: Cigarettes     Quit date:      Years since quittin.8    Smokeless tobacco: Never   Vaping Use    Vaping Use: Never used   Substance Use Topics    Alcohol use: Yes     Alcohol/week: 1.2 - 1.8 oz     Types: 2 - 3 Glasses of wine per week    Drug use: No         MEDICATIONS:    Current Outpatient Medications:     tamsulosin (FLOMAX) 0.4 MG capsule, TAKE 1 CAPSULE DAILY 1/2 HOUR AFTER BREAKFAST., Disp: 90 Capsule, Rfl: 3    atorvastatin (LIPITOR) 20 MG Tab, Take 1 Tablet by mouth every day., Disp: 90 Tablet, Rfl: 3    lisinopril (PRINIVIL) 5 MG Tab, Take 1 Tablet by mouth every day for 360 days., Disp: 90 Tablet, Rfl: 3    levothyroxine (SYNTHROID) 50 MCG Tab, Take 1 Tablet by mouth every day., Disp: 90 Tablet, Rfl: 3    finasteride (PROSCAR) 5 MG Tab, Take 1 Tablet by mouth every day., Disp: 90 Tablet, Rfl: 4    fluticasone (FLONASE) 50 MCG/ACT nasal spray, Administer 1 Spray into affected nostril(S) every day., Disp: 16 g, Rfl: 11    VITAMIN D PO, Take 2,000 Int'l Units by mouth every 48 hours., Disp: , Rfl:     PROBLEMS:  Patient Active Problem List    Diagnosis Date Noted    Dyspnea on exertion 2022    Fatigue 2022    Dizziness 2022    Cough 2021    Nocturia 2021    Finger pain 11/15/2018    Sleep paralysis 11/15/2018    Left shoulder pain 11/15/2018  "   Onychomycosis 11/15/2018    Dyslipidemia 11/15/2018    Hypothyroid 11/15/2018    Essential hypertension 11/15/2018       REVIEW OF SYSTEMS:  Gen.:  No Nausea, Vomiting, fever, Chills.  Heart: No chest pain.  Lungs:  No shortness of Breath.  Psychological: [place] unusual Anxiety depression     PHYSICAL EXAM   Constitutional: Alert, cooperative, not in acute distress.  Cardiovascular:  Rate Rhythm is regular without murmurs rubs clicks.     Thorax & Lungs: Clear to auscultation, no wheezing, rhonchi, or rales  HENT: Normocephalic, Atraumatic.  Eyes: PERRLA, EOMI, Conjunctiva normal.   Neck: Trachia is midline no swelling of the thyroid.   Lymphatic: No lymphadenopathy noted.   Neurologic: Alert & oriented x 3, cranial nerves II through XII are intact, Normal motor function, Normal sensory function, No focal deficits noted.   Psychiatric: Affect normal, Judgment normal, Mood normal.     VITAL SIGNS:/60   Pulse 94   Temp 36.6 °C (97.8 °F) (Temporal)   Resp 12   Ht 1.753 m (5' 9\")   Wt 97.5 kg (215 lb)   SpO2 95%   BMI 31.75 kg/m²     Labs: Reviewed    Assessment:                                                     Plan:    1. Nocturia  Continue Flomax  - tamsulosin (FLOMAX) 0.4 MG capsule; TAKE 1 CAPSULE DAILY 1/2 HOUR AFTER BREAKFAST.  Dispense: 90 Capsule; Refill: 3  - finasteride (PROSCAR) 5 MG Tab; Take 1 Tablet by mouth every day.  Dispense: 90 Tablet; Refill: 4    2. Dyslipidemia  Continue atorvastatin  - atorvastatin (LIPITOR) 20 MG Tab; Take 1 Tablet by mouth every day.  Dispense: 90 Tablet; Refill: 3    3. Essential hypertension  No change in medical therapy  - lisinopril (PRINIVIL) 5 MG Tab; Take 1 Tablet by mouth every day for 360 days.  Dispense: 90 Tablet; Refill: 3    4. Hypothyroidism, unspecified type  Continue Synthroid  - levothyroxine (SYNTHROID) 50 MCG Tab; Take 1 Tablet by mouth every day.  Dispense: 90 Tablet; Refill: 3    5. Seasonal allergies  Continue Flonase  - fluticasone " (FLONASE) 50 MCG/ACT nasal spray; Administer 1 Spray into affected nostril(S) every day.  Dispense: 16 g; Refill: 11

## 2023-11-15 ENCOUNTER — TELEPHONE (OUTPATIENT)
Dept: HEALTH INFORMATION MANAGEMENT | Facility: OTHER | Age: 78
End: 2023-11-15
Payer: MEDICARE

## 2024-03-12 ENCOUNTER — OFFICE VISIT (OUTPATIENT)
Dept: MEDICAL GROUP | Facility: LAB | Age: 79
End: 2024-03-12
Payer: MEDICARE

## 2024-03-12 VITALS
HEIGHT: 69 IN | RESPIRATION RATE: 20 BRPM | WEIGHT: 219 LBS | TEMPERATURE: 98.8 F | SYSTOLIC BLOOD PRESSURE: 120 MMHG | BODY MASS INDEX: 32.44 KG/M2 | HEART RATE: 87 BPM | OXYGEN SATURATION: 96 % | DIASTOLIC BLOOD PRESSURE: 70 MMHG

## 2024-03-12 DIAGNOSIS — H69.92 EUSTACHIAN TUBE DISORDER, LEFT: ICD-10-CM

## 2024-03-12 DIAGNOSIS — I10 ESSENTIAL HYPERTENSION: ICD-10-CM

## 2024-03-12 PROCEDURE — 3078F DIAST BP <80 MM HG: CPT | Performed by: FAMILY MEDICINE

## 2024-03-12 PROCEDURE — 99214 OFFICE O/P EST MOD 30 MIN: CPT | Performed by: FAMILY MEDICINE

## 2024-03-12 PROCEDURE — 3074F SYST BP LT 130 MM HG: CPT | Performed by: FAMILY MEDICINE

## 2024-03-12 RX ORDER — AZELASTINE 1 MG/ML
1 SPRAY, METERED NASAL 2 TIMES DAILY
Qty: 30 ML | Refills: 1 | Status: SHIPPED | OUTPATIENT
Start: 2024-03-12

## 2024-03-12 ASSESSMENT — FIBROSIS 4 INDEX: FIB4 SCORE: 1.1

## 2024-03-12 ASSESSMENT — PATIENT HEALTH QUESTIONNAIRE - PHQ9: CLINICAL INTERPRETATION OF PHQ2 SCORE: 0

## 2024-03-12 NOTE — PROGRESS NOTES
"Subjective:     CC: Left ear full    HPI:   Benedict presents today with persistent left ear fullness.  Evaluated by another provider 5 months ago for this without improvement since then.  Has been using Flonase daily.  This comes and goes he does think it is related to pressure changes, will get worse at times with Valsalva.      Medications, past medical history, allergies, and social history have been reviewed and updated.      Objective:       Exam:  /70   Pulse 87   Temp 37.1 °C (98.8 °F) (Temporal)   Resp 20   Ht 1.753 m (5' 9\")   Wt 99.3 kg (219 lb)   SpO2 96%   BMI 32.34 kg/m²  Body mass index is 32.34 kg/m².    Constitutional: Alert. Well appearing. No distress.  Skin: Warm, dry, good turgor, no visible rashes.  Ears: Right ear canal and right TM are clear.  Left ear canal is narrow with partially visualized TM, visualized portion is clear.  Neuro: Moves all four extremities. No facial droop.  Psych: Answers questions appropriately. Normal affect and mood.    Assessment & Plan:     78 y.o. male with the following -     1. Eustachian tube disorder, left  Persistent left-sided ear fullness, visible TM appears clear on exam.  Has been using fluticasone without improvement.  Add daily nondrowsy antihistamine with Zyrtec, will also add azelastine nasal spray.  Given persistent symptoms will refer to ENT.  - azelastine (ASTELIN) 137 MCG/SPRAY nasal spray; Administer 1 Spray into affected nostril(S) 2 times a day.  Dispense: 30 mL; Refill: 1  - Referral to ENT    2. Essential hypertension  BP at goal, continue lisinopril.      Please note that this note was created using voice recognition software.      "

## 2024-04-10 DIAGNOSIS — H69.92 EUSTACHIAN TUBE DISORDER, LEFT: ICD-10-CM

## 2024-04-10 RX ORDER — AZELASTINE 1 MG/ML
1 SPRAY, METERED NASAL 2 TIMES DAILY
Qty: 30 ML | Refills: 1 | Status: SHIPPED | OUTPATIENT
Start: 2024-04-10 | End: 2024-04-23

## 2024-04-21 DIAGNOSIS — I10 ESSENTIAL HYPERTENSION: ICD-10-CM

## 2024-04-21 DIAGNOSIS — H69.92 EUSTACHIAN TUBE DISORDER, LEFT: ICD-10-CM

## 2024-04-22 NOTE — TELEPHONE ENCOUNTER
Received request via: Pharmacy    Was the patient seen in the last year in this department? Yes    Does the patient have an active prescription (recently filled or refills available) for medication(s) requested? No    Pharmacy Name: [facility]    Does the patient have [facility] Plus and need 100 day supply (blood pressure, diabetes and cholesterol meds only)? Patient does not have SCP

## 2024-04-23 RX ORDER — AZELASTINE 1 MG/ML
SPRAY, METERED NASAL
Qty: 60 ML | Refills: 3 | Status: SHIPPED | OUTPATIENT
Start: 2024-04-23

## 2024-04-23 RX ORDER — LISINOPRIL 5 MG/1
5 TABLET ORAL
Qty: 90 TABLET | Refills: 3 | Status: SHIPPED | OUTPATIENT
Start: 2024-04-23

## 2024-08-27 ENCOUNTER — HOSPITAL ENCOUNTER (OUTPATIENT)
Dept: RADIOLOGY | Facility: MEDICAL CENTER | Age: 79
End: 2024-08-27
Attending: OTOLARYNGOLOGY
Payer: MEDICARE

## 2024-08-27 DIAGNOSIS — H90.0 CONDUCTIVE HEARING LOSS, BILATERAL: ICD-10-CM

## 2024-08-27 DIAGNOSIS — A32.0: ICD-10-CM

## 2024-08-27 PROCEDURE — 70480 CT ORBIT/EAR/FOSSA W/O DYE: CPT

## 2024-09-26 DIAGNOSIS — E78.5 DYSLIPIDEMIA: ICD-10-CM

## 2024-09-26 DIAGNOSIS — E03.9 HYPOTHYROIDISM, UNSPECIFIED TYPE: ICD-10-CM

## 2024-09-26 DIAGNOSIS — R35.1 NOCTURIA: ICD-10-CM

## 2024-09-26 RX ORDER — LEVOTHYROXINE SODIUM 50 UG/1
50 TABLET ORAL
Qty: 90 TABLET | Refills: 1 | Status: SHIPPED | OUTPATIENT
Start: 2024-09-26

## 2024-09-26 RX ORDER — ATORVASTATIN CALCIUM 20 MG/1
20 TABLET, FILM COATED ORAL
Qty: 90 TABLET | Refills: 1 | Status: SHIPPED | OUTPATIENT
Start: 2024-09-26

## 2024-09-26 RX ORDER — TAMSULOSIN HYDROCHLORIDE 0.4 MG/1
CAPSULE ORAL
Qty: 90 CAPSULE | Refills: 1 | Status: SHIPPED | OUTPATIENT
Start: 2024-09-26

## 2024-09-26 NOTE — TELEPHONE ENCOUNTER
Received request via: Pharmacy    Was the patient seen in the last year in this department? Yes3/12/24    Does the patient have an active prescription (recently filled or refills available) for medication(s) requested? No    Pharmacy Name: [facility]    Does the patient have [facility] and need 100-day supply? (This applies to ALL medications) Patient does not have SCP

## 2024-12-08 DIAGNOSIS — R35.1 NOCTURIA: ICD-10-CM

## 2024-12-08 NOTE — TELEPHONE ENCOUNTER
Received request via: Pharmacy    Was the patient seen in the last year in this department? Yes  LOV : 3/12/2024  Does the patient have an active prescription (recently filled or refills available) for medication(s) requested? No    Pharmacy Name: [facility]    Does the patient have [facility] and need 100-day supply? (This applies to ALL medications) Patient does not have SCP

## 2024-12-10 RX ORDER — FINASTERIDE 5 MG/1
5 TABLET, FILM COATED ORAL
Qty: 90 TABLET | Refills: 0 | Status: SHIPPED | OUTPATIENT
Start: 2024-12-10

## 2025-06-09 DIAGNOSIS — I10 ESSENTIAL HYPERTENSION: ICD-10-CM

## 2025-06-09 DIAGNOSIS — R35.1 NOCTURIA: ICD-10-CM

## 2025-06-09 DIAGNOSIS — E03.9 HYPOTHYROIDISM, UNSPECIFIED TYPE: ICD-10-CM

## 2025-06-09 DIAGNOSIS — E78.5 DYSLIPIDEMIA: ICD-10-CM

## 2025-06-09 NOTE — TELEPHONE ENCOUNTER
Received request via: Pharmacy    Was the patient seen in the last year in this department? Yes    Does the patient have an active prescription (recently filled or refills available) for medication(s) requested? No    Pharmacy Name: [facility]    Does the patient have long-term Plus and need 100-day supply? (This applies to ALL medications) Patient does not have SCP

## 2025-06-10 RX ORDER — LEVOTHYROXINE SODIUM 50 UG/1
50 TABLET ORAL
Qty: 90 TABLET | Refills: 0 | Status: SHIPPED | OUTPATIENT
Start: 2025-06-10 | End: 2025-06-19 | Stop reason: SDUPTHER

## 2025-06-10 RX ORDER — FINASTERIDE 5 MG/1
TABLET, FILM COATED ORAL
Qty: 90 TABLET | Refills: 3 | Status: SHIPPED | OUTPATIENT
Start: 2025-06-10 | End: 2025-06-19 | Stop reason: SDUPTHER

## 2025-06-10 RX ORDER — LISINOPRIL 5 MG/1
5 TABLET ORAL
Qty: 90 TABLET | Refills: 0 | Status: SHIPPED | OUTPATIENT
Start: 2025-06-10 | End: 2025-06-19 | Stop reason: SDUPTHER

## 2025-06-10 RX ORDER — ATORVASTATIN CALCIUM 20 MG/1
20 TABLET, FILM COATED ORAL
Qty: 90 TABLET | Refills: 0 | Status: SHIPPED | OUTPATIENT
Start: 2025-06-10 | End: 2025-06-19 | Stop reason: SDUPTHER

## 2025-06-10 RX ORDER — TAMSULOSIN HYDROCHLORIDE 0.4 MG/1
CAPSULE ORAL
Qty: 90 CAPSULE | Refills: 0 | Status: SHIPPED | OUTPATIENT
Start: 2025-06-10 | End: 2025-06-19

## 2025-06-19 ENCOUNTER — OFFICE VISIT (OUTPATIENT)
Dept: MEDICAL GROUP | Facility: LAB | Age: 80
End: 2025-06-19
Payer: MEDICARE

## 2025-06-19 VITALS
TEMPERATURE: 98.6 F | BODY MASS INDEX: 32.67 KG/M2 | OXYGEN SATURATION: 96 % | HEART RATE: 111 BPM | DIASTOLIC BLOOD PRESSURE: 70 MMHG | RESPIRATION RATE: 16 BRPM | WEIGHT: 215.6 LBS | SYSTOLIC BLOOD PRESSURE: 106 MMHG | HEIGHT: 68 IN

## 2025-06-19 DIAGNOSIS — E78.5 DYSLIPIDEMIA: ICD-10-CM

## 2025-06-19 DIAGNOSIS — R73.03 PREDIABETES: ICD-10-CM

## 2025-06-19 DIAGNOSIS — I49.1 ATRIAL PREMATURE COMPLEXES: ICD-10-CM

## 2025-06-19 DIAGNOSIS — N40.1 BENIGN PROSTATIC HYPERPLASIA WITH URINARY OBSTRUCTION: ICD-10-CM

## 2025-06-19 DIAGNOSIS — I10 ESSENTIAL HYPERTENSION: ICD-10-CM

## 2025-06-19 DIAGNOSIS — Z00.00 MEDICARE ANNUAL WELLNESS VISIT, SUBSEQUENT: Primary | ICD-10-CM

## 2025-06-19 DIAGNOSIS — J30.2 SEASONAL ALLERGIES: ICD-10-CM

## 2025-06-19 DIAGNOSIS — E03.9 HYPOTHYROIDISM, UNSPECIFIED TYPE: ICD-10-CM

## 2025-06-19 DIAGNOSIS — R35.1 NOCTURIA: ICD-10-CM

## 2025-06-19 DIAGNOSIS — N13.8 BENIGN PROSTATIC HYPERPLASIA WITH URINARY OBSTRUCTION: ICD-10-CM

## 2025-06-19 PROBLEM — R06.09 DYSPNEA ON EXERTION: Status: RESOLVED | Noted: 2022-07-27 | Resolved: 2025-06-19

## 2025-06-19 PROBLEM — M79.646 FINGER PAIN: Status: RESOLVED | Noted: 2018-11-15 | Resolved: 2025-06-19

## 2025-06-19 PROBLEM — R53.83 FATIGUE: Status: RESOLVED | Noted: 2022-05-03 | Resolved: 2025-06-19

## 2025-06-19 PROBLEM — M25.512 LEFT SHOULDER PAIN: Status: RESOLVED | Noted: 2018-11-15 | Resolved: 2025-06-19

## 2025-06-19 PROBLEM — R42 DIZZINESS: Status: RESOLVED | Noted: 2022-05-03 | Resolved: 2025-06-19

## 2025-06-19 PROBLEM — R05.9 COUGH: Status: RESOLVED | Noted: 2021-01-07 | Resolved: 2025-06-19

## 2025-06-19 PROCEDURE — 3078F DIAST BP <80 MM HG: CPT | Performed by: FAMILY MEDICINE

## 2025-06-19 PROCEDURE — G0439 PPPS, SUBSEQ VISIT: HCPCS | Performed by: FAMILY MEDICINE

## 2025-06-19 PROCEDURE — 99214 OFFICE O/P EST MOD 30 MIN: CPT | Mod: 25 | Performed by: FAMILY MEDICINE

## 2025-06-19 PROCEDURE — 3074F SYST BP LT 130 MM HG: CPT | Performed by: FAMILY MEDICINE

## 2025-06-19 RX ORDER — FINASTERIDE 5 MG/1
5 TABLET, FILM COATED ORAL DAILY
Qty: 100 TABLET | Refills: 3 | Status: SHIPPED | OUTPATIENT
Start: 2025-06-19

## 2025-06-19 RX ORDER — LISINOPRIL 5 MG/1
5 TABLET ORAL
Qty: 100 TABLET | Refills: 3 | Status: SHIPPED | OUTPATIENT
Start: 2025-06-19

## 2025-06-19 RX ORDER — AZELASTINE 1 MG/ML
1 SPRAY, METERED NASAL 2 TIMES DAILY
Qty: 60 ML | Refills: 3 | Status: SHIPPED | OUTPATIENT
Start: 2025-06-19

## 2025-06-19 RX ORDER — TAMSULOSIN HYDROCHLORIDE 0.4 MG/1
0.4 CAPSULE ORAL 2 TIMES DAILY
Qty: 200 CAPSULE | Refills: 3 | Status: SHIPPED | OUTPATIENT
Start: 2025-06-19

## 2025-06-19 RX ORDER — ATORVASTATIN CALCIUM 20 MG/1
20 TABLET, FILM COATED ORAL
Qty: 100 TABLET | Refills: 3 | Status: SHIPPED | OUTPATIENT
Start: 2025-06-19

## 2025-06-19 RX ORDER — LEVOTHYROXINE SODIUM 50 UG/1
50 TABLET ORAL
Qty: 100 TABLET | Refills: 3 | Status: SHIPPED | OUTPATIENT
Start: 2025-06-19

## 2025-06-19 RX ORDER — FLUTICASONE PROPIONATE 50 MCG
1 SPRAY, SUSPENSION (ML) NASAL DAILY
Qty: 16 G | Refills: 11 | Status: SHIPPED | OUTPATIENT
Start: 2025-06-19

## 2025-06-19 ASSESSMENT — PATIENT HEALTH QUESTIONNAIRE - PHQ9: CLINICAL INTERPRETATION OF PHQ2 SCORE: 0

## 2025-06-19 ASSESSMENT — ENCOUNTER SYMPTOMS: GENERAL WELL-BEING: GOOD

## 2025-06-19 ASSESSMENT — ACTIVITIES OF DAILY LIVING (ADL): BATHING_REQUIRES_ASSISTANCE: 0

## 2025-06-19 ASSESSMENT — FIBROSIS 4 INDEX: FIB4 SCORE: 1.12

## 2025-06-19 NOTE — PROGRESS NOTES
Chief Complaint   Patient presents with    Annual Exam     Pt presents for annual wellness exam.       HPI:  Benedict Marmolejo is a 79 y.o. here for Medicare Annual Wellness Visit     He does some recent episodes of urinary retention and he increased his Flomax to twice daily.  Has not seen urology in some time.    Irregular pulse noted on exam.  He denies symptoms including palpitations, dizziness.    Patient Active Problem List    Diagnosis Date Noted    Benign prostatic hyperplasia with urinary obstruction 06/19/2025    Atrial premature complexes 06/19/2025    Prediabetes 06/19/2025    Nocturia 01/07/2021    Sleep paralysis 11/15/2018    Onychomycosis 11/15/2018    Dyslipidemia 11/15/2018    Hypothyroid 11/15/2018    Essential hypertension 11/15/2018       Current Medications[1]       Current supplements as per medication list.     Allergies: Patient has no known allergies.    Current social contact/activities: not much, lives with daughter, family visits     He  reports that he quit smoking about 36 years ago. His smoking use included pipe. He started smoking about 46 years ago. He has never used smokeless tobacco. He reports current alcohol use of about 7.2 - 8.4 oz of alcohol per week. He reports that he does not use drugs.  Counseling given: Not Answered      ROS:    Gait: Uses a cane - occasionally   Ostomy: No  Other tubes: No  Amputations: No  Chronic oxygen use: No  Last eye exam: 1.5 years ago  Wears hearing aids: Yes   : Reports urinary leakage during the last 6 months that has interfered a lot with their daily activities or sleep.    Screening:    Depression Screening  Little interest or pleasure in doing things?  0 - not at all  Feeling down, depressed , or hopeless? 0 - not at all  Patient Health Questionnaire Score: 0     If depressive symptoms identified deferred to follow up visit unless specifically addressed in assessment and plan.    Interpretation of PHQ-9 Total Score   Score Severity    1-4 No Depression   5-9 Mild Depression   10-14 Moderate Depression   15-19 Moderately Severe Depression   20-27 Severe Depression    Screening for Cognitive Impairment  Do you or any of your friends or family members have any concern about your memory? Yes  Three Minute Recall (Village, Kitchen, Baby) 3/3    Bubba clock face with all 12 numbers and set the hands to show 10 minutes past 11.  Yes    Cognitive concerns identified deferred for follow up unless specifically addressed in assessment and plan.    Fall Risk Assessment  Has the patient had two or more falls in the last year or any fall with injury in the last year?  Yes    Safety Assessment  Do you always wear your seatbelt?  Yes  Any changes to home needed to function safely? No  Difficulty hearing.  Yes  Patient counseled about all safety risks that were identified.    Functional Assessment ADLs  Are there any barriers preventing you from cooking for yourself or meeting nutritional needs?  No.    Are there any barriers preventing you from driving safely or obtaining transportation?  No.    Are there any barriers preventing you from using a telephone or calling for help?  No    Are there any barriers preventing you from shopping?  No.    Are there any barriers preventing you from taking care of your own finances?  No    Are there any barriers preventing you from managing your medications?  No    Are there any barriers preventing you from showering, bathing or dressing yourself? No    Are there any barriers preventing you from doing housework or laundry? No  Are there any barriers preventing you from using the toilet?No  Are you currently engaging in any exercise or physical activity?  No.      Self-Assessment of Health  What is your perception of your health? Good    Do you sleep more than six hours a night? Yes    In the past 7 days, how much did pain keep you from doing your normal work? Some Patient ran out of tamsulosin and was having trouble urinating  which caused pain ~ 3 days in the last two weeks.  Do you spend quality time with family or friends (virtually or in person)? Yes Wife passed 6 months ago, daughter lives with him.  Do you usually eat a heart healthy diet that constists of a variety of fruits, vegetables, whole grains and fiber? No    Do you eat foods high in fat and/or Fast Food more than three times per week? No    How concerned are you that your medical conditions are not being well managed? a little    Are you worried that in the next 2 months, you may not have stable housing that you own, rent, or stay in as part of a household? No      Advance Care Planning  Do you have an Advance Directive, Living Will, Durable Power of , or POLST? Yes    Living Will Durable Power of    is not on file - instructed patient to bring in a copy to scan into their chart      Health Maintenance Summary            Current Care Gaps       COVID-19 Vaccine (4 - 2024-25 season) Overdue since 9/1/2024      11/10/2021  Imm Admin: MODERNA SARS-COV-2 VACCINE (12+)    02/25/2021  Imm Admin: MODERNA SARS-COV-2 VACCINE (12+)    01/27/2021  Imm Admin: MODERNA SARS-COV-2 VACCINE (12+)              IMM DTaP/Tdap/Td Vaccine (1 - Tdap) Never done     No completion history exists for this topic.              Zoster (Shingles) Vaccines (1 of 2) Never done     No completion history exists for this topic.                      Needs Review       Hepatitis C Screening  Tentatively Complete      11/27/2018  Hepatitis C Antibody component of HEP C VIRUS ANTIBODY                      Upcoming       Influenza Vaccine (Season Ended) Next due on 9/1/2025      10/01/2021  Outside Immunization:                     Influenza, split virus, quadrivalent, preservative                      12/01/2020  Imm Admin: Influenza, Unspecified - HISTORICAL DATA    11/02/2019  Imm Admin: Influenza Vaccine Adult HD    09/15/2018  Imm Admin: Influenza Vaccine Adult HD    09/12/2018  Imm Admin:  Influenza, Unspecified - HISTORICAL DATA     Only the first 5 history entries have been loaded, but more history exists.            Annual Wellness Visit (Yearly) Next due on 6/19/2026 06/19/2025  Level of Service: [place] ANNUAL WELLNESS VISIT-INCLUDES PPPS SUBSEQUE*    06/19/2025  Visit Dx: Medicare annual wellness visit, subsequent                      Completed or No Longer Recommended       Pneumococcal Vaccine: 50+ Years (Series Information) Completed      08/14/2018  Imm Admin: Pneumococcal polysaccharide vaccine (PPSV-23)    06/30/2017  Imm Admin: Pneumococcal Conjugate Vaccine (Prevnar/PCV-13)              Hepatitis A Vaccine (Hep A) (Series Information) Aged Out      No completion history exists for this topic.              Hepatitis B Vaccine (Hep B) (Series Information) Aged Out     No completion history exists for this topic.              HPV Vaccines (Series Information) Aged Out     No completion history exists for this topic.              Polio Vaccine (Inactivated Polio) (Series Information) Aged Out     No completion history exists for this topic.              Meningococcal Immunization (Series Information) Aged Out     No completion history exists for this topic.              Meningococcal B Vaccine (Series Information) Aged Out     No completion history exists for this topic.              Diabetes: Retinopathy Screening  Discontinued        Frequency changed to Never automatically (Topic No Longer Applies)    05/13/2022  REFERRAL FOR RETINAL SCREENING EXAM    06/20/2019  Done - See report in Media              A1c Screening  Discontinued        Frequency changed to Never automatically (Topic No Longer Applies)    06/19/2025  Order placed for HEMOGLOBIN A1C by Jose Elias Shepard M.D.    06/17/2020  HEMOGLOBIN A1C    10/09/2019  HEMOGLOBIN A1C    07/05/2019  HEMOGLOBIN A1C     Only the first 5 history entries have been loaded, but more history exists.            Fasting Lipid Profile   "Discontinued        Frequency changed to Never automatically (Topic No Longer Applies)    06/19/2025  Order placed for Lipid Profile by Jose Elias Shepard M.D.    10/10/2023  Lipid Profile    05/26/2022  Lipid Profile    07/12/2021  Lipid Profile      Only the first 5 history entries have been loaded, but more history exists.              Diabetes: Urine Protein Screening  Discontinued        Frequency changed to Never automatically (Topic No Longer Applies)    10/09/2019  MICROALBUMIN CREAT RATIO URINE              SERUM CREATININE  Discontinued        Frequency changed to Never automatically (Topic No Longer Applies)    06/19/2025  Order placed for Comp Metabolic Panel by Jose Elias Shepard M.D.    10/10/2023  Comp Metabolic Panel    05/26/2022  Comp Metabolic Panel    07/12/2021  Comp Metabolic Panel      Only the first 5 history entries have been loaded, but more history exists.              Diabetes: Monofilament / LE Exam  Discontinued        Frequency changed to Never automatically (Topic No Longer Applies)    04/11/2019  Diabetic Monofilament LE Exam                            Patient Care Team:  Jose Elias Shepard M.D. as PCP - General (Family Medicine)  [facility] (Inactive) as Consulting Physician  Amos Pino M.D. (Ophthalmology)  Etienne Gentile M.D. (Cardiovascular Disease (Cardiology))  Myles Strong M.D. (Family Medicine)        Social History[2]  Family History   Family history unknown: Yes     He  has a past medical history of Cataract, Diabetes (HCC), Hyperlipidemia, Hypertension, and Thyroid disease.   Past Surgical History[3]    Exam:   /70 (BP Location: Left arm, Patient Position: Sitting, BP Cuff Size: Large adult)   Pulse (!) 111   Temp 37 °C (98.6 °F) (Temporal)   Resp 16   Ht 1.727 m (5' 8\")   Wt 97.8 kg (215 lb 9.6 oz)   SpO2 96%  Body mass index is 32.78 kg/m².    Hearing fair.    Dentition good  Alert, oriented in no acute distress.  Eye " contact is good, speech goal directed, affect calm  CV: Irregular rhythm, no MRG.  Lungs: CTAB  Ext: No edema    EKG interpretation by me: Sinus rhythm, frequent APCs. HR is 87.  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  Quality of EKG is good.      Assessment and Plan. The following treatment and monitoring plan is recommended:      1. Medicare annual wellness visit, subsequent    2. Nocturia  3. Benign prostatic hyperplasia with urinary obstruction  Worsening BPH symptoms including some recent episodes of urinary retention.  He did self titrate Flomax to twice daily and this has helped.  Continue current Flomax dosing, finasteride.  Encouraged follow-up with urology.  - tamsulosin (FLOMAX) 0.4 MG capsule; Take 1 Capsule by mouth 2 times a day. TAKE 1 CAPSULE EVERY DAY 1/2 HOUR AFTER BREAKFAST  Dispense: 200 Capsule; Refill: 3  - finasteride (PROSCAR) 5 MG Tab; Take 1 Tablet by mouth every day.  Dispense: 100 Tablet; Refill: 3    4. Essential hypertension  BP at goal.  Continue lisinopril.  - CBC WITH DIFFERENTIAL; Future  - Comp Metabolic Panel; Future  - lisinopril (PRINIVIL) 5 MG Tab; Take 1 Tablet by mouth every day.  Dispense: 100 Tablet; Refill: 3    5. Dyslipidemia  Continue atorvastatin, recheck lipids.  - atorvastatin (LIPITOR) 20 MG Tab; Take 1 Tablet by mouth every day.  Dispense: 100 Tablet; Refill: 3  - Lipid Profile; Future    6. Seasonal allergies  Continue nasal sprays.  - azelastine (ASTELIN) 0.1 % nasal spray; Administer 1 Spray into affected nostril(S) 2 times a day.  Dispense: 60 mL; Refill: 3  - fluticasone (FLONASE) 50 MCG/ACT nasal spray; Administer 1 Spray into affected nostril(S) every day.  Dispense: 16 g; Refill: 11    7. Hypothyroidism, unspecified type  Continue levothyroxine 50 mcg daily, check TSH.  - TSH WITH REFLEX TO FT4; Future  - levothyroxine (SYNTHROID) 50 MCG Tab; Take 1 Tablet by mouth every day.  Dispense: 100 Tablet; Refill: 3    8. Prediabetes  - HEMOGLOBIN A1C;  Future    9. Atrial premature complexes  Irregular pulse noted on exam.  Follow-up EKG with frequent APCs.  He denies symptoms.  Echo in 2022 without concerning findings.  Will hold on additional workup for now given he is asymptomatic.  - EKG - Clinic Performed    Services suggested: No services needed at this time  Health Care Screening: Age-appropriate preventive services recommended by USPTF and ACIP covered by Medicare were discussed today. Services ordered if indicated and agreed upon by the patient.  Referrals offered: Community-based lifestyle interventions to reduce health risks and promote self-management and wellness, fall prevention, nutrition, physical activity, tobacco-use cessation, weight loss, and mental health services as per orders if indicated.    Discussion today about general wellness and lifestyle habits:    Prevent falls and reduce trip hazards; Cautioned about securing or removing rugs.  Have a working fire alarm and carbon monoxide detector;   Engage in regular physical activity and social activities     Follow-up: No follow-ups on file.          [1]   Current Outpatient Medications   Medication Sig Dispense Refill    tamsulosin (FLOMAX) 0.4 MG capsule Take 1 Capsule by mouth 2 times a day. TAKE 1 CAPSULE EVERY DAY 1/2 HOUR AFTER BREAKFAST 200 Capsule 3    atorvastatin (LIPITOR) 20 MG Tab Take 1 Tablet by mouth every day. 100 Tablet 3    azelastine (ASTELIN) 0.1 % nasal spray Administer 1 Spray into affected nostril(S) 2 times a day. 60 mL 3    finasteride (PROSCAR) 5 MG Tab Take 1 Tablet by mouth every day. 100 Tablet 3    fluticasone (FLONASE) 50 MCG/ACT nasal spray Administer 1 Spray into affected nostril(S) every day. 16 g 11    levothyroxine (SYNTHROID) 50 MCG Tab Take 1 Tablet by mouth every day. 100 Tablet 3    lisinopril (PRINIVIL) 5 MG Tab Take 1 Tablet by mouth every day. 100 Tablet 3    VITAMIN D PO Take 2,000 Int'l Units by mouth every 48 hours.       No current  facility-administered medications for this visit.   [2]   Social History  Tobacco Use    Smoking status: Former     Types: Pipe     Start date:      Quit date:      Years since quittin.4    Smokeless tobacco: Never   Vaping Use    Vaping status: Never Used   Substance Use Topics    Alcohol use: Yes     Alcohol/week: 7.2 - 8.4 oz     Types: 12 - 14 Glasses of wine per week    Drug use: No   [3]   Past Surgical History:  Procedure Laterality Date    EYE SURGERY

## 2025-06-24 ENCOUNTER — HOSPITAL ENCOUNTER (OUTPATIENT)
Dept: LAB | Facility: MEDICAL CENTER | Age: 80
End: 2025-06-24
Attending: FAMILY MEDICINE
Payer: MEDICARE

## 2025-06-24 DIAGNOSIS — R73.03 PREDIABETES: ICD-10-CM

## 2025-06-24 DIAGNOSIS — E78.5 DYSLIPIDEMIA: ICD-10-CM

## 2025-06-24 DIAGNOSIS — I10 ESSENTIAL HYPERTENSION: ICD-10-CM

## 2025-06-24 DIAGNOSIS — E03.9 HYPOTHYROIDISM, UNSPECIFIED TYPE: ICD-10-CM

## 2025-06-26 ENCOUNTER — HOSPITAL ENCOUNTER (OUTPATIENT)
Dept: LAB | Facility: MEDICAL CENTER | Age: 80
End: 2025-06-26
Attending: FAMILY MEDICINE
Payer: MEDICARE

## 2025-06-26 LAB
ALBUMIN SERPL BCP-MCNC: 4.1 G/DL (ref 3.2–4.9)
ALBUMIN/GLOB SERPL: 1.6 G/DL
ALP SERPL-CCNC: 83 U/L (ref 30–99)
ALT SERPL-CCNC: 30 U/L (ref 2–50)
ANION GAP SERPL CALC-SCNC: 12 MMOL/L (ref 7–16)
AST SERPL-CCNC: 21 U/L (ref 12–45)
BASOPHILS # BLD AUTO: 0.5 % (ref 0–1.8)
BASOPHILS # BLD: 0.03 K/UL (ref 0–0.12)
BILIRUB SERPL-MCNC: 0.4 MG/DL (ref 0.1–1.5)
BUN SERPL-MCNC: 14 MG/DL (ref 8–22)
CALCIUM ALBUM COR SERPL-MCNC: 9 MG/DL (ref 8.5–10.5)
CALCIUM SERPL-MCNC: 9.1 MG/DL (ref 8.5–10.5)
CHLORIDE SERPL-SCNC: 104 MMOL/L (ref 96–112)
CHOLEST SERPL-MCNC: 175 MG/DL (ref 100–199)
CO2 SERPL-SCNC: 21 MMOL/L (ref 20–33)
CREAT SERPL-MCNC: 0.92 MG/DL (ref 0.5–1.4)
EOSINOPHIL # BLD AUTO: 0.06 K/UL (ref 0–0.51)
EOSINOPHIL NFR BLD: 1 % (ref 0–6.9)
ERYTHROCYTE [DISTWIDTH] IN BLOOD BY AUTOMATED COUNT: 43.7 FL (ref 35.9–50)
EST. AVERAGE GLUCOSE BLD GHB EST-MCNC: 117 MG/DL
GFR SERPLBLD CREATININE-BSD FMLA CKD-EPI: 85 ML/MIN/1.73 M 2
GLOBULIN SER CALC-MCNC: 2.6 G/DL (ref 1.9–3.5)
GLUCOSE SERPL-MCNC: 103 MG/DL (ref 65–99)
HBA1C MFR BLD: 5.7 % (ref 4–5.6)
HCT VFR BLD AUTO: 49.1 % (ref 42–52)
HDLC SERPL-MCNC: 47 MG/DL
HGB BLD-MCNC: 17 G/DL (ref 14–18)
IMM GRANULOCYTES # BLD AUTO: 0.01 K/UL (ref 0–0.11)
IMM GRANULOCYTES NFR BLD AUTO: 0.2 % (ref 0–0.9)
LDLC SERPL CALC-MCNC: 92 MG/DL
LYMPHOCYTES # BLD AUTO: 1.93 K/UL (ref 1–4.8)
LYMPHOCYTES NFR BLD: 30.7 % (ref 22–41)
MCH RBC QN AUTO: 33.8 PG (ref 27–33)
MCHC RBC AUTO-ENTMCNC: 34.6 G/DL (ref 32.3–36.5)
MCV RBC AUTO: 97.6 FL (ref 81.4–97.8)
MONOCYTES # BLD AUTO: 0.58 K/UL (ref 0–0.85)
MONOCYTES NFR BLD AUTO: 9.2 % (ref 0–13.4)
NEUTROPHILS # BLD AUTO: 3.68 K/UL (ref 1.82–7.42)
NEUTROPHILS NFR BLD: 58.4 % (ref 44–72)
NRBC # BLD AUTO: 0 K/UL
NRBC BLD-RTO: 0 /100 WBC (ref 0–0.2)
PLATELET # BLD AUTO: 198 K/UL (ref 164–446)
PMV BLD AUTO: 8.6 FL (ref 9–12.9)
POTASSIUM SERPL-SCNC: 4.8 MMOL/L (ref 3.6–5.5)
PROT SERPL-MCNC: 6.7 G/DL (ref 6–8.2)
RBC # BLD AUTO: 5.03 M/UL (ref 4.7–6.1)
SODIUM SERPL-SCNC: 137 MMOL/L (ref 135–145)
TRIGL SERPL-MCNC: 181 MG/DL (ref 0–149)
TSH SERPL DL<=0.005 MIU/L-ACNC: 4.95 UIU/ML (ref 0.38–5.33)
WBC # BLD AUTO: 6.3 K/UL (ref 4.8–10.8)

## 2025-06-26 PROCEDURE — 84443 ASSAY THYROID STIM HORMONE: CPT

## 2025-06-26 PROCEDURE — 83036 HEMOGLOBIN GLYCOSYLATED A1C: CPT | Mod: GA

## 2025-06-26 PROCEDURE — 80053 COMPREHEN METABOLIC PANEL: CPT

## 2025-06-26 PROCEDURE — 85025 COMPLETE CBC W/AUTO DIFF WBC: CPT

## 2025-06-26 PROCEDURE — 80061 LIPID PANEL: CPT

## 2025-06-26 PROCEDURE — 36415 COLL VENOUS BLD VENIPUNCTURE: CPT

## 2025-06-27 ENCOUNTER — RESULTS FOLLOW-UP (OUTPATIENT)
Dept: MEDICAL GROUP | Facility: LAB | Age: 80
End: 2025-06-27
Payer: MEDICARE